# Patient Record
Sex: FEMALE | Race: WHITE | NOT HISPANIC OR LATINO | Employment: PART TIME | URBAN - METROPOLITAN AREA
[De-identification: names, ages, dates, MRNs, and addresses within clinical notes are randomized per-mention and may not be internally consistent; named-entity substitution may affect disease eponyms.]

---

## 2022-01-18 ENCOUNTER — OCCMED (OUTPATIENT)
Dept: URGENT CARE | Facility: CLINIC | Age: 37
End: 2022-01-18

## 2022-01-18 ENCOUNTER — APPOINTMENT (OUTPATIENT)
Dept: LAB | Facility: CLINIC | Age: 37
End: 2022-01-18

## 2022-01-18 DIAGNOSIS — Z02.1 ENCOUNTER FOR PRE-EMPLOYMENT HEALTH SCREENING EXAMINATION: Primary | ICD-10-CM

## 2022-01-18 DIAGNOSIS — Z02.1 ENCOUNTER FOR PRE-EMPLOYMENT HEALTH SCREENING EXAMINATION: ICD-10-CM

## 2022-01-18 LAB — RUBV IGG SERPL IA-ACNC: 55.6 IU/ML

## 2022-01-18 PROCEDURE — 86735 MUMPS ANTIBODY: CPT

## 2022-01-18 PROCEDURE — 86762 RUBELLA ANTIBODY: CPT

## 2022-01-18 PROCEDURE — 86787 VARICELLA-ZOSTER ANTIBODY: CPT

## 2022-01-18 PROCEDURE — 86480 TB TEST CELL IMMUN MEASURE: CPT

## 2022-01-18 PROCEDURE — 86765 RUBEOLA ANTIBODY: CPT

## 2022-01-18 PROCEDURE — 36415 COLL VENOUS BLD VENIPUNCTURE: CPT

## 2022-01-20 LAB
GAMMA INTERFERON BACKGROUND BLD IA-ACNC: 0.02 IU/ML
M TB IFN-G BLD-IMP: NEGATIVE
M TB IFN-G CD4+ BCKGRND COR BLD-ACNC: 0 IU/ML
M TB IFN-G CD4+ BCKGRND COR BLD-ACNC: 0 IU/ML
MEV IGG SER QL: NORMAL
MITOGEN IGNF BCKGRD COR BLD-ACNC: 8.93 IU/ML
MUV IGG SER QL: NORMAL
VZV IGG SER IA-ACNC: NORMAL

## 2022-06-15 ENCOUNTER — OFFICE VISIT (OUTPATIENT)
Dept: OBGYN CLINIC | Facility: CLINIC | Age: 37
End: 2022-06-15
Payer: COMMERCIAL

## 2022-06-15 VITALS — WEIGHT: 225.2 LBS

## 2022-06-15 DIAGNOSIS — Z01.419 ENCOUNTER FOR WELL WOMAN EXAM WITH ROUTINE GYNECOLOGICAL EXAM: Primary | ICD-10-CM

## 2022-06-15 DIAGNOSIS — Z30.430 ENCOUNTER FOR IUD INSERTION: ICD-10-CM

## 2022-06-15 DIAGNOSIS — Z01.419 ENCOUNTER FOR ROUTINE GYNECOLOGICAL EXAMINATION WITH PAPANICOLAOU SMEAR OF CERVIX: ICD-10-CM

## 2022-06-15 PROCEDURE — S0610 ANNUAL GYNECOLOGICAL EXAMINA: HCPCS | Performed by: OBSTETRICS & GYNECOLOGY

## 2022-06-15 PROCEDURE — G0145 SCR C/V CYTO,THINLAYER,RESCR: HCPCS | Performed by: OBSTETRICS & GYNECOLOGY

## 2022-06-15 PROCEDURE — G0476 HPV COMBO ASSAY CA SCREEN: HCPCS | Performed by: OBSTETRICS & GYNECOLOGY

## 2022-06-15 PROCEDURE — 58300 INSERT INTRAUTERINE DEVICE: CPT | Performed by: OBSTETRICS & GYNECOLOGY

## 2022-06-15 NOTE — PROGRESS NOTES
[facility] [address], [address], [place], [place]    ASSESSMENT/PLAN: Alejandro Francis is a 30 y o  [id number] who presents for annual gynecologic exam     Encounter for routine gynecologic examination  - Routine well woman exam completed today  - Cervical Cancer Screening: Current ASCCP Guidelines reviewed  Last Pap: 06/15/2022  Next Pap Due: today  - Contraceptive counseling discussed  Current contraception: Micronor -> Mirena inserted today     Additional problems addressed during this visit:  1  Encounter for well woman exam with routine gynecological exam    2  Encounter for routine gynecological examination with Papanicolaou smear of cervix  -     Liquid-based pap, screening    3  Encounter for IUD insertion  -     levonorgestrel (MIRENA) IUD 20 mcg/day  -     Iud insertions      CC:  Annual Gynecologic Examination    HPI: Alejandro Francis is a 30 y o  [id number] who presents for annual gynecologic examination  HPI  Patient presents for routine annual, still breastfeeding her 9 month old, recently had first cycle since delivery  Interested in IUD for [facility]  counseled and inserted today  Does report a history of postpartum anxiety, recently weaned off Zoloft due to weight gain and decreased libido  Was on Wellbutrin in the past, will consider restarting    The following portions of the patient's history were reviewed and updated as appropriate: allergies, current medications, past family history, past medical history, obstetric history, gynecologic history, past social history, past surgical history and problem list     Review of Systems   Constitutional: Negative  HENT: Negative  Eyes: Negative  Respiratory: Negative  Cardiovascular: Negative  Gastrointestinal: Negative  Genitourinary: Negative  Musculoskeletal: Negative  All other systems reviewed and are negative  Objective:   Wt 102 kg (225 lb 3 2 oz)   LMP 06/10/2022 Comment: 6/10/-6/13 Breastfeeding Yes    Physical Exam  Vitals reviewed  Constitutional:       General: She is not in acute distress  Appearance: She is well-developed  HENT:      Head: Normocephalic and atraumatic  Nose: Nose normal    Cardiovascular:      Rate and Rhythm: Normal rate  Pulmonary:      Effort: Pulmonary effort is normal  No respiratory distress  Chest:   Breasts: Breasts are symmetrical       Right: Normal  No mass, nipple discharge, skin change, tenderness, axillary adenopathy or supraclavicular adenopathy  Left: Normal  No mass, nipple discharge, skin change, tenderness, axillary adenopathy or supraclavicular adenopathy  Abdominal:      General: There is no distension  Palpations: Abdomen is soft  There is no mass  Tenderness: There is no abdominal tenderness  There is no guarding or rebound  Genitourinary:     General: Normal vulva  Exam position: Lithotomy position  Labia:         Right: No lesion  Left: No lesion  Urethra: No prolapse (urethral meatus normal)  Vagina: Normal  No vaginal discharge, erythema or bleeding  Cervix: Normal       Uterus: Normal        Adnexa: Right adnexa normal and left adnexa normal    Musculoskeletal:         General: Normal range of motion  Cervical back: Normal range of motion  Lymphadenopathy:      Upper Body:      Right upper body: No supraclavicular, axillary or pectoral adenopathy  Left upper body: No supraclavicular, axillary or pectoral adenopathy  Lower Body: No right inguinal adenopathy  No left inguinal adenopathy  Skin:     General: Skin is warm and dry  Neurological:      Mental Status: She is alert and oriented to person, place, and time  Psychiatric:         Behavior: Behavior normal          Thought Content:  Thought content normal          Judgment: Judgment normal

## 2022-06-15 NOTE — PROGRESS NOTES
Iud insertions    Date/Time: 6/15/2022 3:30 PM  Performed by: Laura Delacruz MD  Authorized by: Laura Delacruz MD   Universal Protocol:  Consent: Verbal consent obtained  Risks and benefits: risks, benefits and alternatives were discussed  Consent given by: patient  Time out: Immediately prior to procedure a "time out" was called to verify the correct patient, procedure, equipment, support staff and site/side marked as required    Patient understanding: patient states understanding of the procedure being performed  Patient consent: the patient's understanding of the procedure matches consent given  Procedure consent: procedure consent matches procedure scheduled  Patient identity confirmed: verbally with patient        Procedure:     Pelvic exam performed: yes      Cervix cleaned and prepped: yes      Speculum placed in vagina: yes      Tenaculum applied to cervix: yes      Uterus sounded: yes      Uterus sound depth (cm):  9    IUD inserted with no complications: yes      IUD type:  Mirena    Strings trimmed: yes

## 2022-06-16 LAB
HPV HR 12 DNA CVX QL NAA+PROBE: NEGATIVE
HPV16 DNA CVX QL NAA+PROBE: NEGATIVE
HPV18 DNA CVX QL NAA+PROBE: NEGATIVE

## 2022-06-22 LAB
LAB AP GYN PRIMARY INTERPRETATION: NORMAL
LAB AP LMP: NORMAL
Lab: NORMAL

## 2022-07-08 ENCOUNTER — OFFICE VISIT (OUTPATIENT)
Dept: URGENT CARE | Facility: MEDICAL CENTER | Age: 37
End: 2022-07-08
Payer: COMMERCIAL

## 2022-07-08 VITALS
SYSTOLIC BLOOD PRESSURE: 112 MMHG | OXYGEN SATURATION: 97 % | DIASTOLIC BLOOD PRESSURE: 74 MMHG | RESPIRATION RATE: 18 BRPM | TEMPERATURE: 101.4 F | HEART RATE: 140 BPM

## 2022-07-08 DIAGNOSIS — K52.9 GASTROENTERITIS, ACUTE: Primary | ICD-10-CM

## 2022-07-08 LAB
SARS-COV-2 AG UPPER RESP QL IA: NEGATIVE
VALID CONTROL: NORMAL

## 2022-07-08 PROCEDURE — 87811 SARS-COV-2 COVID19 W/OPTIC: CPT

## 2022-07-08 PROCEDURE — 99213 OFFICE O/P EST LOW 20 MIN: CPT

## 2022-07-08 NOTE — PATIENT INSTRUCTIONS
Ibuprofen and/or Tylenol as needed for discomfort and/or fever  Follow the brat diet  Rest and stay hydrated  Follow-up with your primary care provider  Go to ED for worsening symptoms

## 2022-07-08 NOTE — PROGRESS NOTES
[facility]        NAME: Travis Guerrero is a 30 y o  female  : 1985    MRN: [id number]  DATE: 2022  TIME: 6:32 PM    Assessment and Plan   Gastroenteritis, acute [K52 9]  1  Gastroenteritis, acute  Poct Covid 19 Rapid Antigen Test         Patient Instructions     Ibuprofen and/or Tylenol as needed for discomfort and/or fever  Follow the brat diet  Rest and stay hydrated  Follow-up with your primary care provider  Go to ED for worsening symptoms  Chief Complaint     Chief Complaint   Patient presents with    Diarrhea     Patient c/o diarrhea, body aches, and chills that started this morning  Patient tested negatives on a at home Covid test today  History of Present Illness       Patient complaining of abdominal cramping, diarrhea, body aches, fatigue, nausea, and fever that started yesterday  She reports taking a rapid COVID test at home this morning with the the results  She denies trying alleviating factors  Patient denies significant PMH  Review of Systems   Review of Systems   Constitutional: Positive for fatigue and fever  HENT: Negative for congestion, sinus pressure and sore throat  Respiratory: Negative for cough and shortness of breath  Cardiovascular: Negative for chest pain  Gastrointestinal: Positive for abdominal pain, diarrhea and nausea  Negative for abdominal distention, blood in stool and vomiting  Genitourinary: Negative for dysuria  All other systems reviewed and are negative          Current Medications       Current Outpatient Medications:     Prenatal Vit-DSS-Fe Cbn-FA (PRENATAL AD PO), Take by mouth, Disp: , Rfl:     Current Allergies     Allergies as of 2022 - Reviewed 2022   Allergen Reaction Noted    Sulfa antibiotics Other (See Comments) 06/15/2022            The following portions of the patient's history were reviewed and updated as appropriate: allergies, current medications, past family history, past medical history, past social history, past surgical history and problem list      History reviewed  No pertinent past medical history  Past Surgical History:   Procedure Laterality Date     SECTION, LOW TRANSVERSE         History reviewed  No pertinent family history  Medications have been verified  Objective   /74   Pulse (!) 140   Temp (!) 101 4 °F (38 6 °C)   Resp 18   LMP 06/10/2022 Comment: 6/10/-  SpO2 97%        Physical Exam     Physical Exam  Vitals and nursing note reviewed  Constitutional:       General: She is not in acute distress  Appearance: Normal appearance  She is not toxic-appearing  HENT:      Head: Normocephalic and atraumatic  Right Ear: Tympanic membrane, ear canal and external ear normal       Left Ear: Tympanic membrane, ear canal and external ear normal       Nose: Nose normal  No congestion or rhinorrhea  Mouth/Throat:      Mouth: Mucous membranes are moist       Pharynx: Oropharynx is clear  No oropharyngeal exudate or posterior oropharyngeal erythema  Eyes:      General: No scleral icterus  Right eye: No discharge  Left eye: No discharge  Conjunctiva/sclera: Conjunctivae normal    Cardiovascular:      Rate and Rhythm: Normal rate and regular rhythm  Heart sounds: Normal heart sounds  Pulmonary:      Effort: Pulmonary effort is normal       Breath sounds: Normal breath sounds  Abdominal:      General: Bowel sounds are normal       Palpations: Abdomen is soft  Tenderness: There is no abdominal tenderness  There is no guarding or rebound  Musculoskeletal:         General: Normal range of motion  Cervical back: Normal range of motion  Lymphadenopathy:      Cervical: No cervical adenopathy  Skin:     General: Skin is warm and dry  Neurological:      General: No focal deficit present  Mental Status: She is alert and oriented to person, place, and time     Psychiatric:         Mood and Affect: Mood normal          Behavior: Behavior normal

## 2022-07-11 ENCOUNTER — TELEPHONE (OUTPATIENT)
Dept: OBGYN CLINIC | Facility: MEDICAL CENTER | Age: 37
End: 2022-07-11

## 2022-07-11 DIAGNOSIS — N61.0 MASTITIS: Primary | ICD-10-CM

## 2022-07-11 NOTE — TELEPHONE ENCOUNTER
LMOM for patient to call office to schedule a office visit regarding possible mastitis  Pt offered visits today and tomorrow   Please schedule once call returned

## 2022-07-18 ENCOUNTER — TELEPHONE (OUTPATIENT)
Dept: FAMILY MEDICINE CLINIC | Facility: CLINIC | Age: 37
End: 2022-07-18

## 2022-08-02 ENCOUNTER — TELEPHONE (OUTPATIENT)
Dept: OBGYN CLINIC | Facility: MEDICAL CENTER | Age: 37
End: 2022-08-02

## 2022-09-06 ENCOUNTER — OFFICE VISIT (OUTPATIENT)
Dept: OBGYN CLINIC | Facility: CLINIC | Age: 37
End: 2022-09-06
Payer: COMMERCIAL

## 2022-09-06 VITALS
DIASTOLIC BLOOD PRESSURE: 64 MMHG | BODY MASS INDEX: 40.75 KG/M2 | WEIGHT: 230 LBS | SYSTOLIC BLOOD PRESSURE: 114 MMHG | HEIGHT: 63 IN

## 2022-09-06 DIAGNOSIS — Z30.431 IUD CHECK UP: Primary | ICD-10-CM

## 2022-09-06 PROBLEM — Z97.5 IUD (INTRAUTERINE DEVICE) IN PLACE: Status: ACTIVE | Noted: 2022-09-06

## 2022-09-06 PROCEDURE — 99213 OFFICE O/P EST LOW 20 MIN: CPT | Performed by: OBSTETRICS & GYNECOLOGY

## 2022-09-06 NOTE — PROGRESS NOTES
Assessment/Plan    Diagnoses and all orders for this visit:    IUD check up    Other orders  -     Levonorgestrel (MIRENA) 20 MCG/DAY IUD; 1 each by Intrauterine route once         Ward Harvey is an 30 y o  woman who presents for IUD string check  Patient has the following IUD: Mirena  She reports no complaints  Pt does not report bleeding issues  Pt does not report pain issues  Patient Active Problem List   Diagnosis    IUD (intrauterine device) in place       No past medical history on file  Current Outpatient Medications:     Levonorgestrel (MIRENA) 20 MCG/DAY IUD, 1 each by Intrauterine route once , Disp: , Rfl:     Prenatal Vit-DSS-Fe Cbn-FA (PRENATAL AD PO), Take by mouth, Disp: , Rfl:     Allergies   Allergen Reactions    Sulfa Antibiotics Other (See Comments)     Mouth ulcers   Stomach ulcers         Review of Systems  Constitutional :no fever, feels well, no tiredness, no recent weight gain or loss  ENT: no ear ache, no loss of hearing, no nosebleeds or nasal discharge, no sore throat or hoarseness  Cardiovascular: no complaints of slow or fast heart beat, no chest pain, no palpitations, no leg claudication or lower extremity edema  Respiratory: no complaints of shortness of shortness of breath, no BELTRAN  Breasts:no complaints of breast pain, breast lump, or nipple discharge  Gastrointestinal: no complaints of abdominal pain, constipation, nausea, vomiting, or diarrhea or bloody stools  Genitourinary : no complaints of dysuria, incontinence, pelvic pain, no dysmenorrhea, vaginal discharge or abnormal vaginal bleeding and as noted in HPI    Integumentary: no complaints of skin rash or lesion, itching or dry skin  Neurological: no complaints of headache, no confusion, no numbness or tingling, no dizziness or fainting    Objective    /64   Ht 5' 3" (1 6 m)   Wt 104 kg (230 lb)   LMP 07/13/2022 (Approximate)   BMI 40 74 kg/m²     General: alert and oriented, in no acute distress   Psychiatric orientation to person, place and time: normal   Mood and affect: normal   Vulva: normal   Vagina: normal mucosa   Cervix: IUD strings x 2

## 2022-09-09 ENCOUNTER — APPOINTMENT (OUTPATIENT)
Dept: LAB | Facility: CLINIC | Age: 37
End: 2022-09-09

## 2022-09-09 DIAGNOSIS — Z00.8 HEALTH EXAMINATION IN POPULATION SURVEY: ICD-10-CM

## 2022-09-09 LAB
CHOLEST SERPL-MCNC: 245 MG/DL
EST. AVERAGE GLUCOSE BLD GHB EST-MCNC: 108 MG/DL
HBA1C MFR BLD: 5.4 %
HDLC SERPL-MCNC: 35 MG/DL
LDLC SERPL CALC-MCNC: 169 MG/DL (ref 0–100)
NONHDLC SERPL-MCNC: 210 MG/DL
TRIGL SERPL-MCNC: 207 MG/DL

## 2022-09-09 PROCEDURE — 83036 HEMOGLOBIN GLYCOSYLATED A1C: CPT

## 2022-09-09 PROCEDURE — 36415 COLL VENOUS BLD VENIPUNCTURE: CPT

## 2022-09-09 PROCEDURE — 80061 LIPID PANEL: CPT

## 2022-10-18 ENCOUNTER — OFFICE VISIT (OUTPATIENT)
Dept: FAMILY MEDICINE CLINIC | Facility: CLINIC | Age: 37
End: 2022-10-18
Payer: COMMERCIAL

## 2022-10-18 VITALS
DIASTOLIC BLOOD PRESSURE: 80 MMHG | HEART RATE: 89 BPM | WEIGHT: 228 LBS | RESPIRATION RATE: 16 BRPM | HEIGHT: 63 IN | TEMPERATURE: 97.7 F | BODY MASS INDEX: 40.4 KG/M2 | SYSTOLIC BLOOD PRESSURE: 116 MMHG | OXYGEN SATURATION: 98 %

## 2022-10-18 DIAGNOSIS — Z11.4 SCREENING FOR HIV (HUMAN IMMUNODEFICIENCY VIRUS): ICD-10-CM

## 2022-10-18 DIAGNOSIS — Z23 NEED FOR INFLUENZA VACCINATION: ICD-10-CM

## 2022-10-18 DIAGNOSIS — E78.5 HYPERLIPIDEMIA, UNSPECIFIED HYPERLIPIDEMIA TYPE: ICD-10-CM

## 2022-10-18 DIAGNOSIS — Z13.29 SCREENING FOR THYROID DISORDER: ICD-10-CM

## 2022-10-18 DIAGNOSIS — Z00.00 ANNUAL PHYSICAL EXAM: Primary | ICD-10-CM

## 2022-10-18 DIAGNOSIS — E56.9 VITAMIN DEFICIENCY: ICD-10-CM

## 2022-10-18 DIAGNOSIS — Z11.59 NEED FOR HEPATITIS C SCREENING TEST: ICD-10-CM

## 2022-10-18 DIAGNOSIS — Z13.89 ENCOUNTER FOR SURVEILLANCE OF ABNORMAL NEVI: ICD-10-CM

## 2022-10-18 DIAGNOSIS — F41.9 ANXIETY: ICD-10-CM

## 2022-10-18 DIAGNOSIS — Z13.0 SCREENING, ANEMIA, DEFICIENCY, IRON: ICD-10-CM

## 2022-10-18 DIAGNOSIS — Z76.89 ENCOUNTER TO ESTABLISH CARE: ICD-10-CM

## 2022-10-18 DIAGNOSIS — R53.83 OTHER FATIGUE: ICD-10-CM

## 2022-10-18 DIAGNOSIS — Z13.1 SCREENING FOR DIABETES MELLITUS: ICD-10-CM

## 2022-10-18 PROCEDURE — 90471 IMMUNIZATION ADMIN: CPT | Performed by: NURSE PRACTITIONER

## 2022-10-18 PROCEDURE — 99385 PREV VISIT NEW AGE 18-39: CPT | Performed by: NURSE PRACTITIONER

## 2022-10-18 PROCEDURE — 90686 IIV4 VACC NO PRSV 0.5 ML IM: CPT | Performed by: NURSE PRACTITIONER

## 2022-10-18 NOTE — PROGRESS NOTES
ADULT ANNUAL [address] [place] [address] [facility]    NAME: Promise White  AGE: 30 y o  SEX: female  : 1985     DATE: 10/18/2022  Comprehensive physical exam  PMH and chronic conditions reviewed  Medications reconciled  Preventative care and recommended screenings as below  Continue annual physicals  Follow-up sooner as needed   Assessment and Plan:     Problem List Items Addressed This Visit        Musculoskeletal and Integument    Encounter for surveillance of abnormal nevi     2 nevi removed by Derm in [address] [place] [address]  Left lateral abdominal nevi appears to be returning  Uncertain of biopsy results of prior shaves  Will refer to Derm today for evaluation and likely biopsy,  and to establish for routine skin checks  Relevant Orders    Ambulatory Referral to Dermatology       Other    Hyperlipidemia     History of  Pt states elevated as teen/young adult  Lipids for Caring Starts with You in September: 245///169  Referral to weight management placed  Recheck lipids 6 months  May benefit from low dose statin therapy  Will discuss after repeat labs completed  Relevant Orders    Ambulatory Referral to Weight Management    Other fatigue     Notes for the last several months  Discussed likely multi factorial: recent move to this area, 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), 3 children (ages 11,3, 16 month), allergies  Will get labs today as below  Assess thyroid iron and vitamins  Will discuss possible treatment options once resulted  Relevant Orders    Vitamin B12    Vitamin D 25 hydroxy    Comprehensive metabolic panel    CBC and differential    Iron Panel (Includes Ferritin, Iron Sat%, Iron, and TIBC)    TSH, 3rd generation with Free T4 reflex    BMI 40 0-44 9, adult (HCC)     Motivated for lifestyle change - looking to get healthier   Referral to establish with weight management Relevant Orders    Ambulatory Referral to Weight Management    Anxiety     Long time history of  Has been on medications in the past  Most recent Sertraline post partum  Noted increased weight gain  Did successfully wean a few months ago  No depression, but does note periods of persistent anxiety  Options reviewed  Will avoid SSRI  She reports anxiety fairly well managed on Wellbutrin in college  Will consider restarting  Currently lactating  Plans to wean baby off in the next weeks/months  Will consider starting Wellbutrin - 200SR daily,  once wean completed  Other Visit Diagnoses     Annual physical exam    -  Primary    Vitamin deficiency        Relevant Orders    Vitamin B12    Vitamin D 25 hydroxy    Screening for thyroid disorder        Relevant Orders    TSH, 3rd generation with Free T4 reflex    Screening, anemia, deficiency, iron        Relevant Orders    CBC and differential    Iron Panel (Includes Ferritin, Iron Sat%, Iron, and TIBC)    Screening for diabetes mellitus        Relevant Orders    Comprehensive metabolic panel    Screening for HIV (human immunodeficiency virus)        Relevant Orders    HIV 1/2 Antigen/Antibody (4th Generation) w Reflex [facility]    Need for hepatitis C screening test        Relevant Orders    Hepatitis C antibody    Encounter to establish care        Establishing primary in our office  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)    Need for influenza vaccination        Relevant Orders    influenza vaccine, quadrivalent, 0 5 mL, preservative-free, for adult and pediatric patients 6 mos+ (AFLURIA, FLUARIX, FLULAVAL, FLUZONE) (Completed)          Immunizations and preventive care screenings were discussed with patient today  Appropriate education was printed on patient's after visit summary      Counseling:  Alcohol/drug use: discussed moderation in alcohol intake, the recommendations for healthy alcohol use, and avoidance of illicit drug use   Dental Health: discussed importance of regular tooth brushing, flossing, and dental visits  Injury prevention: discussed safety/seat belts, safety helmets, smoke detectors, carbon dioxide detectors, and smoking near bedding or upholstery  Sexual health: discussed sexually transmitted diseases, partner selection, use of condoms, avoidance of unintended pregnancy, and contraceptive alternatives  · Exercise: the importance of regular exercise/physical activity was discussed  Recommend exercise 3-5 times per week for at least 30 minutes  BMI Counseling: Body mass index is 40 39 kg/m²  The BMI is above normal  Nutrition recommendations include decreasing portion sizes, consuming healthier snacks, moderation in carbohydrate intake, increasing intake of lean protein and reducing intake of saturated and trans fat  Exercise recommendations include moderate physical activity 150 minutes/week  Patient referred to weight management  Rationale for BMI follow-up plan is due to patient being overweight or obese  Healthy lifestyle counseling     Depression Screening and Follow-up Plan: Patient was screened for depression during today's encounter  They screened negative with a PHQ-2 score of 0  Return in about 1 year (around 10/18/2023) for Annual physical      Chief Complaint:     Chief Complaint   Patient presents with   • Establish Care      History of Present Illness:     Adult Annual Physical   Patient here for a comprehensive physical exam  The patient reports no problems  Diet and Physical Activity  · Diet/Nutrition: well balanced diet and working to improve diet  · Exercise: no formal exercise  Depression Screening  PHQ-2/9 Depression Screening    Little interest or pleasure in doing things: 0 - not at all  Feeling down, depressed, or hopeless: 0 - not at all  PHQ-2 Score: 0  PHQ-2 Interpretation: Negative depression screen       General Health  · Sleep: sleeps well     · Hearing: normal - bilateral   · Vision: no vision problems, most recent eye exam >1 year ago, previous LASIK surgery and lasik at age 24  Snellen: 20/20 OU today  · Dental: regular dental visits  /GYN Health  · Last menstrual period: IUD  · Contraceptive method: IUD placement  · History of STDs?: no   · Monogamous relationship  · Women's health up to date  · PAP/HPV (-) 2022  · Encourage self breast exams  · Immunizations up to date: Covid ; Tdap 2021; Influenza today  · Vitamin supplementation - will discuss further once labs completed     Review of Systems:     Review of Systems   Constitutional: Positive for fatigue  Negative for activity change, appetite change and unexpected weight change  HENT: Negative  Eyes: Negative  Respiratory: Negative  Cardiovascular: Negative  Gastrointestinal: Negative  Genitourinary: Negative  Musculoskeletal: Negative  Skin: Negative  Neurological: Negative  Psychiatric/Behavioral: Negative  Past Medical History:     History reviewed  No pertinent past medical history  Past Surgical History:     Past Surgical History:   Procedure Laterality Date   •  SECTION, LOW TRANSVERSE        Social History:     Social History     Socioeconomic History   • Marital status: /Civil Union     Spouse name: None   • Number of children: None   • Years of education: None   • Highest education level: None   Occupational History   • None   Tobacco Use   • Smoking status: Never Smoker   • Smokeless tobacco: Never Used   Vaping Use   • Vaping Use: Never used   Substance and Sexual Activity   • Alcohol use: Never   • Drug use: Never   • Sexual activity: Yes     Partners: Male     Birth control/protection: I U D     Other Topics Concern   • None   Social History Narrative   • None     Social Determinants of Health     Financial Resource Strain: Not on file   Food Insecurity: Not on file   Transportation Needs: Not on file   Physical Activity: Not on file Stress: Not on file   Social Connections: Not on file   Intimate Partner Violence: Not on file   Housing Stability: Not on file      Family History:     History reviewed  No pertinent family history  Current Medications:     Current Outpatient Medications   Medication Sig Dispense Refill   • Levonorgestrel (MIRENA) 20 MCG/DAY IUD 1 each by Intrauterine route once      • Prenatal Vit-DSS-Fe Cbn-FA (PRENATAL AD PO) Take by mouth       No current facility-administered medications for this visit  Allergies: Allergies   Allergen Reactions   • Sulfa Antibiotics Other (See Comments)     Mouth ulcers   Stomach ulcers        Physical Exam:     /80 (BP Location: Right arm, Patient Position: Sitting, Cuff Size: Large)   Pulse 89   Temp 97 7 °F (36 5 °C) (Temporal)   Resp 16   Ht 5' 3" (1 6 m)   Wt 103 kg (228 lb)   SpO2 98%   BMI 40 39 kg/m²     Physical Exam  Vitals and nursing note reviewed  Constitutional:       General: She is not in acute distress  Appearance: Normal appearance  She is well-developed and well-groomed  She is not ill-appearing  HENT:      Head: Normocephalic  Right Ear: Tympanic membrane, ear canal and external ear normal       Left Ear: Tympanic membrane, ear canal and external ear normal       Nose: Nose normal       Mouth/Throat:      Mouth: Mucous membranes are moist       Pharynx: Oropharynx is clear  Eyes:      Conjunctiva/sclera: Conjunctivae normal       Pupils: Pupils are equal, round, and reactive to light  Neck:      Thyroid: No thyromegaly  Vascular: No carotid bruit  Cardiovascular:      Rate and Rhythm: Normal rate and regular rhythm  Pulses:           Posterior tibial pulses are 2+ on the right side and 2+ on the left side  Heart sounds: Normal heart sounds  Pulmonary:      Effort: Pulmonary effort is normal  No respiratory distress  Breath sounds: Normal breath sounds and air entry     Abdominal:      General: Bowel sounds are normal       Palpations: Abdomen is soft  Tenderness: There is no abdominal tenderness  Musculoskeletal:      Right lower leg: No edema  Left lower leg: No edema  Lymphadenopathy:      Cervical: No cervical adenopathy  Skin:     General: Skin is warm and dry  Neurological:      General: No focal deficit present  Mental Status: She is alert and oriented to person, place, and time  Psychiatric:         Attention and Perception: Attention normal          Mood and Affect: Mood normal          Behavior: Behavior normal          Thought Content:  Thought content normal          Judgment: Judgment normal           INA Garces   [address] [facility] [address]

## 2022-10-18 NOTE — ASSESSMENT & PLAN NOTE
Long time history of  Has been on medications in the past  Most recent Sertraline post partum  Noted increased weight gain  Did successfully wean a few months ago  No depression, but does note periods of persistent anxiety  Options reviewed  Will avoid SSRI  She reports anxiety fairly well managed on Wellbutrin in college  Will consider restarting  Currently lactating  Plans to wean baby off in the next weeks/months  Will consider starting Wellbutrin - 200SR daily,  once wean completed

## 2022-10-18 NOTE — PATIENT INSTRUCTIONS

## 2022-10-18 NOTE — ASSESSMENT & PLAN NOTE
2 nevi removed by Derm in [place]  Left lateral abdominal nevi appears to be returning  Uncertain of biopsy results of prior shaves  Will refer to Derm today for evaluation and likely biopsy,  and to establish for routine skin checks

## 2022-10-18 NOTE — ASSESSMENT & PLAN NOTE
Motivated for lifestyle change - looking to get healthier   Referral to establish with weight management

## 2022-10-18 NOTE — ASSESSMENT & PLAN NOTE
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

## 2022-10-18 NOTE — ASSESSMENT & PLAN NOTE
History of  Pt states elevated as teen/young adult  Lipids for [facility] Starts with You in September: 245/207/35/169  Referral to weight management placed  Recheck lipids 6 months  May benefit from low dose statin therapy  Will discuss after repeat labs completed

## 2022-10-19 ENCOUNTER — APPOINTMENT (OUTPATIENT)
Dept: LAB | Facility: CLINIC | Age: 37
End: 2022-10-19
Payer: COMMERCIAL

## 2022-10-19 DIAGNOSIS — Z11.59 NEED FOR HEPATITIS C SCREENING TEST: ICD-10-CM

## 2022-10-19 DIAGNOSIS — Z13.1 SCREENING FOR DIABETES MELLITUS: ICD-10-CM

## 2022-10-19 DIAGNOSIS — Z11.4 SCREENING FOR HIV (HUMAN IMMUNODEFICIENCY VIRUS): ICD-10-CM

## 2022-10-19 DIAGNOSIS — E56.9 VITAMIN DEFICIENCY: ICD-10-CM

## 2022-10-19 DIAGNOSIS — R53.83 OTHER FATIGUE: ICD-10-CM

## 2022-10-19 DIAGNOSIS — Z13.0 SCREENING, ANEMIA, DEFICIENCY, IRON: ICD-10-CM

## 2022-10-19 DIAGNOSIS — Z13.29 SCREENING FOR THYROID DISORDER: ICD-10-CM

## 2022-10-19 LAB
25(OH)D3 SERPL-MCNC: 23.8 NG/ML (ref 30–100)
ALBUMIN SERPL BCP-MCNC: 3.8 G/DL (ref 3.5–5)
ALP SERPL-CCNC: 104 U/L (ref 46–116)
ALT SERPL W P-5'-P-CCNC: 37 U/L (ref 12–78)
ANION GAP SERPL CALCULATED.3IONS-SCNC: 4 MMOL/L (ref 4–13)
AST SERPL W P-5'-P-CCNC: 17 U/L (ref 5–45)
BASOPHILS # BLD AUTO: 0.03 THOUSANDS/ÂΜL (ref 0–0.1)
BASOPHILS NFR BLD AUTO: 0 % (ref 0–1)
BILIRUB SERPL-MCNC: 0.29 MG/DL (ref 0.2–1)
BUN SERPL-MCNC: 13 MG/DL (ref 5–25)
CALCIUM SERPL-MCNC: 9.3 MG/DL (ref 8.3–10.1)
CHLORIDE SERPL-SCNC: 108 MMOL/L (ref 96–108)
CO2 SERPL-SCNC: 26 MMOL/L (ref 21–32)
CREAT SERPL-MCNC: 0.79 MG/DL (ref 0.6–1.3)
EOSINOPHIL # BLD AUTO: 0.09 THOUSAND/ÂΜL (ref 0–0.61)
EOSINOPHIL NFR BLD AUTO: 1 % (ref 0–6)
ERYTHROCYTE [DISTWIDTH] IN BLOOD BY AUTOMATED COUNT: 13.7 % (ref 11.6–15.1)
FERRITIN SERPL-MCNC: 58 NG/ML (ref 8–388)
GFR SERPL CREATININE-BSD FRML MDRD: 97 ML/MIN/1.73SQ M
GLUCOSE P FAST SERPL-MCNC: 90 MG/DL (ref 65–99)
HCT VFR BLD AUTO: 42.9 % (ref 34.8–46.1)
HCV AB SER QL: NORMAL
HGB BLD-MCNC: 13.3 G/DL (ref 11.5–15.4)
IMM GRANULOCYTES # BLD AUTO: 0.03 THOUSAND/UL (ref 0–0.2)
IMM GRANULOCYTES NFR BLD AUTO: 0 % (ref 0–2)
IRON SATN MFR SERPL: 20 % (ref 15–50)
IRON SERPL-MCNC: 66 UG/DL (ref 50–170)
LYMPHOCYTES # BLD AUTO: 2.61 THOUSANDS/ÂΜL (ref 0.6–4.47)
LYMPHOCYTES NFR BLD AUTO: 33 % (ref 14–44)
MCH RBC QN AUTO: 25.8 PG (ref 26.8–34.3)
MCHC RBC AUTO-ENTMCNC: 31 G/DL (ref 31.4–37.4)
MCV RBC AUTO: 83 FL (ref 82–98)
MONOCYTES # BLD AUTO: 0.45 THOUSAND/ÂΜL (ref 0.17–1.22)
MONOCYTES NFR BLD AUTO: 6 % (ref 4–12)
NEUTROPHILS # BLD AUTO: 4.63 THOUSANDS/ÂΜL (ref 1.85–7.62)
NEUTS SEG NFR BLD AUTO: 60 % (ref 43–75)
NRBC BLD AUTO-RTO: 0 /100 WBCS
PLATELET # BLD AUTO: 287 THOUSANDS/UL (ref 149–390)
PMV BLD AUTO: 10 FL (ref 8.9–12.7)
POTASSIUM SERPL-SCNC: 4 MMOL/L (ref 3.5–5.3)
PROT SERPL-MCNC: 7.8 G/DL (ref 6.4–8.4)
RBC # BLD AUTO: 5.16 MILLION/UL (ref 3.81–5.12)
SODIUM SERPL-SCNC: 138 MMOL/L (ref 135–147)
TIBC SERPL-MCNC: 324 UG/DL (ref 250–450)
TSH SERPL DL<=0.05 MIU/L-ACNC: 1.82 UIU/ML (ref 0.45–4.5)
VIT B12 SERPL-MCNC: 1035 PG/ML (ref 100–900)
WBC # BLD AUTO: 7.84 THOUSAND/UL (ref 4.31–10.16)

## 2022-10-19 PROCEDURE — 36415 COLL VENOUS BLD VENIPUNCTURE: CPT

## 2022-10-19 PROCEDURE — 83540 ASSAY OF IRON: CPT

## 2022-10-19 PROCEDURE — 85025 COMPLETE CBC W/AUTO DIFF WBC: CPT

## 2022-10-19 PROCEDURE — 82607 VITAMIN B-12: CPT

## 2022-10-19 PROCEDURE — 80053 COMPREHEN METABOLIC PANEL: CPT

## 2022-10-19 PROCEDURE — 87389 HIV-1 AG W/HIV-1&-2 AB AG IA: CPT

## 2022-10-19 PROCEDURE — 82306 VITAMIN D 25 HYDROXY: CPT

## 2022-10-19 PROCEDURE — 84443 ASSAY THYROID STIM HORMONE: CPT

## 2022-10-19 PROCEDURE — 83550 IRON BINDING TEST: CPT

## 2022-10-19 PROCEDURE — 86803 HEPATITIS C AB TEST: CPT

## 2022-10-19 PROCEDURE — 82728 ASSAY OF FERRITIN: CPT

## 2022-10-20 LAB — HIV 1+2 AB+HIV1 P24 AG SERPL QL IA: NORMAL

## 2022-12-05 ENCOUNTER — CONSULT (OUTPATIENT)
Dept: BARIATRICS | Facility: CLINIC | Age: 37
End: 2022-12-05

## 2022-12-05 VITALS
BODY MASS INDEX: 39.88 KG/M2 | HEIGHT: 64 IN | OXYGEN SATURATION: 99 % | HEART RATE: 101 BPM | SYSTOLIC BLOOD PRESSURE: 128 MMHG | WEIGHT: 233.6 LBS | RESPIRATION RATE: 18 BRPM | DIASTOLIC BLOOD PRESSURE: 80 MMHG

## 2022-12-05 DIAGNOSIS — E78.5 HYPERLIPIDEMIA, UNSPECIFIED HYPERLIPIDEMIA TYPE: ICD-10-CM

## 2022-12-05 DIAGNOSIS — E66.9 OBESE: Primary | ICD-10-CM

## 2022-12-05 NOTE — PROGRESS NOTES
-Discussed options of [facility]-Intensive Lifestyle Intervention Program, Very Low Calorie Diet-VLCD and Conservative Program and the role of weight loss medications   -Initial weight loss goal of 5-10% weight loss for improved health  -Screening labs 10/2022 - high cholesterol only   -Patient is interested in pursuing Conservative Program with RD visits   - attributes much of her weight gain to pregnancies and emotional eating and having been on lexapro in the past  Also recently moved to [place] from [address] [place] [address] this past year and has been more stressed   - of note, patient is currently still breastfeeding her son  We dicussed that we do not recommend active weight loss as she continues to breastfeed however can recommend healthy diet and eating through RD visits  Once she is done breastfeeding we can focus on more active weight loss       Assessment/Plan:    Follow up in approximately 2 months  Goals:  Food log (ie ) www myfitnesspal com,sparkpeople  com,loseit com,calorieking  com,etc  baritastic  No sugary beverages  At least 64oz of water daily  Increase physical activity by 10 minutes daily  Gradually increase physical activity to a goal of 5 days per week for 30 minutes of MODERATE intensity PLUS 2 days per week of FULL BODY resistance training  5-10 servings of fruits and vegetables per day and 25-35 grams of dietary fiber per day, gradually increasing  Follow up with dietician for menu planning     Diagnoses and all orders for this visit:    Obese  -     Ambulatory Referral to Weight Management    Hyperlipidemia, unspecified hyperlipidemia type  -     Ambulatory Referral to Weight Management        Subjective:   Chief Complaint   Patient presents with   • Consult     MWM waist 43 5, no goal just health, s/b 2-8 neg      Patient ID: Demetrio Oscar  is a 30 y o  female with excess weight/obesity here to pursue weight management  History reviewed  No pertinent past medical history    HPI:  Obesity/Excess Weight:  Severity: class III   Onset:  years    Modifiers: Diet and Exercise, weight watchers   Contributing factors: Stress/Emotional Eating and Pregnancy  Colonoscopy-Not applicable    Goals: just healthy   STOPBAN/8    The following portions of the patient's history were reviewed and updated as appropriate: allergies, current medications, past family history, past medical history, past social history, past surgical history and problem list     Review of Systems   Constitutional: Negative  Respiratory: Negative  Cardiovascular: Negative  Gastrointestinal: Negative  Neurological: Negative  Psychiatric/Behavioral: Negative  Objective:    /80   Pulse 101   Resp 18   Ht 5' 3 5" (1 613 m)   Wt 106 kg (233 lb 9 6 oz)   SpO2 99%   BMI 40 73 kg/m²     Physical Exam  Vitals and nursing note reviewed  Constitutional:       Appearance: Normal appearance  She is obese  HENT:      Head: Normocephalic and atraumatic  Eyes:      Extraocular Movements: Extraocular movements intact  Pupils: Pupils are equal, round, and reactive to light  Cardiovascular:      Rate and Rhythm: Normal rate  Pulmonary:      Effort: Pulmonary effort is normal    Musculoskeletal:         General: Normal range of motion  Cervical back: Normal range of motion  Skin:     General: Skin is warm and dry  Neurological:      General: No focal deficit present  Mental Status: She is alert and oriented to person, place, and time     Psychiatric:         Mood and Affect: Mood normal

## 2022-12-05 NOTE — PATIENT INSTRUCTIONS
Follow up in approximately 2 months  Goals:  Food log (ie ) www myfitnesspal com,sparkpeople  com,loseit com,calorieking  com,etc  baritastic  No sugary beverages  At least 64oz of water daily  Increase physical activity by 10 minutes daily   Gradually increase physical activity to a goal of 5 days per week for 30 minutes of MODERATE intensity PLUS 2 days per week of FULL BODY resistance training  5-10 servings of fruits and vegetables per day and 25-35 grams of dietary fiber per day, gradually increasing  Follow up with dietician for menu planning

## 2022-12-29 ENCOUNTER — TELEPHONE (OUTPATIENT)
Dept: BARIATRICS | Facility: CLINIC | Age: 37
End: 2022-12-29

## 2023-07-26 ENCOUNTER — OFFICE VISIT (OUTPATIENT)
Dept: FAMILY MEDICINE CLINIC | Facility: CLINIC | Age: 38
End: 2023-07-26
Payer: COMMERCIAL

## 2023-07-26 VITALS
RESPIRATION RATE: 20 BRPM | HEART RATE: 93 BPM | BODY MASS INDEX: 41.99 KG/M2 | OXYGEN SATURATION: 98 % | TEMPERATURE: 98.1 F | HEIGHT: 63 IN | DIASTOLIC BLOOD PRESSURE: 78 MMHG | SYSTOLIC BLOOD PRESSURE: 116 MMHG | WEIGHT: 237 LBS

## 2023-07-26 DIAGNOSIS — Z13.0 SCREENING, ANEMIA, DEFICIENCY, IRON: ICD-10-CM

## 2023-07-26 DIAGNOSIS — M25.50 ARTHRALGIA, UNSPECIFIED JOINT: Primary | ICD-10-CM

## 2023-07-26 DIAGNOSIS — R74.8 INCREASED VITAMIN B12 LEVEL: ICD-10-CM

## 2023-07-26 DIAGNOSIS — Z13.1 SCREENING FOR DIABETES MELLITUS: ICD-10-CM

## 2023-07-26 DIAGNOSIS — E78.5 HYPERLIPIDEMIA, UNSPECIFIED HYPERLIPIDEMIA TYPE: ICD-10-CM

## 2023-07-26 DIAGNOSIS — R47.89 WORD FINDING DIFFICULTY: ICD-10-CM

## 2023-07-26 DIAGNOSIS — E55.9 VITAMIN D DEFICIENCY: ICD-10-CM

## 2023-07-26 DIAGNOSIS — R53.83 OTHER FATIGUE: ICD-10-CM

## 2023-07-26 DIAGNOSIS — Z13.29 SCREENING FOR THYROID DISORDER: ICD-10-CM

## 2023-07-26 DIAGNOSIS — R61 EXCESSIVE SWEATING: ICD-10-CM

## 2023-07-26 DIAGNOSIS — E04.9 ENLARGED THYROID: ICD-10-CM

## 2023-07-26 PROCEDURE — 99214 OFFICE O/P EST MOD 30 MIN: CPT | Performed by: NURSE PRACTITIONER

## 2023-07-26 NOTE — PROGRESS NOTES
[facility]    ASSESSMENT AND PLAN     1. Arthralgia, unspecified joint  Assessment & Plan:  Multiple symptoms discussed at length today. Note work up 9/2022 for excessive fatigue. Will recheck vitamins and thyroid today. Thyroid appears possible mildly enlarged today - will check US as well. Will r/o auto immune: check TONG, CRP, RF. Referral to Endo for consult and possible further work up for endocrine etiology. Advised to f/u with Gyn - IUD check  Discussed anxiety as possible component: may consider SNRI in future (once completed breast feeding) if work up (-). She has tried and failed with few SSRI: Lexapro, Sertraline, Celexa. Has not followed with Weight management yet - waiting to completely wean son from breast feeding - plan a t age 5 next month. F/U here in 2 weeks - review labs and imaging - annual physical at that time. F/U sooner with further problems/concerns. Pt agreeable with this plan    Orders:  -     TONG Screen w/ Reflex to Titer/Pattern; Future  -     C-reactive protein; Future  -     RF Screen w/ Reflex to Titer; Future  -     Ambulatory Referral to Endocrinology; Future    2. Excessive sweating  -     TONG Screen w/ Reflex to Titer/Pattern; Future  -     C-reactive protein; Future  -     Ambulatory Referral to Endocrinology; Future    3. Word finding difficulty  -     TONG Screen w/ Reflex to Titer/Pattern; Future  -     C-reactive protein; Future    4. Enlarged thyroid  -     US thyroid; Future; Expected date: 07/26/2023    5. Vitamin D deficiency  -     Vitamin D 25 hydroxy; Future    6. Other fatigue  -     TSH, 3rd generation with Free T4 reflex; Future  -     TONG Screen w/ Reflex to Titer/Pattern; Future  -     C-reactive protein; Future  -     RF Screen w/ Reflex to Titer; Future  -     Ambulatory Referral to Endocrinology; Future    7. Increased vitamin B12 level  -     Vitamin B12; Future    8.  Hyperlipidemia, unspecified hyperlipidemia type  -     Lipid panel; Future    9. Screening for thyroid disorder  -     TSH, 3rd generation with Free T4 reflex; Future    10. Screening, anemia, deficiency, iron  -     CBC and differential; Future    11. Screening for diabetes mellitus  -     Comprehensive metabolic panel; Future  -     HEMOGLOBIN A1C W/ EAG ESTIMATION; Future         SUBJECTIVE       Patient ID: Elías Godinez is a 41 y.o. female. Chief Complaint   Patient presents with   • Physical Exam       HISTORY OF PRESENT ILLNESS    Presents for check up. Reports concerns lately. Notes increased anxiety, finding herself emotional/tearful randomly, fatigued (but admits to poor sleep), stressed and overwhelmed at times, increased excessive sweating, recent unusual hair growth (chest wall and neck), decreased focus and difficulty with word finding at times. She has random joint pains - especially in the morning when first waking. Reports she feels "crazy" sometimes. No other memory issues. 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 Does admit to increased stress. She and her family moved here from [place] 1.5 years ago. It has not been as they expected, but cannot afford to return home at this time. Does miss her friends and support that she had there. Has Mirena IUD - placed one year ago        The following portions of the patient's history were reviewed and updated as appropriate: allergies, current medications, past family history, past medical history, past social history, past surgical history, and problem list.    REVIEW OF SYSTEMS  Review of Systems   Constitutional: Positive for activity change, diaphoresis, fatigue and unexpected weight change. Negative for appetite change and fever. Respiratory: Negative. Cardiovascular: Negative. Gastrointestinal: Negative. Genitourinary: Negative. Musculoskeletal: Positive for arthralgias. Neurological: Negative for weakness.    Psychiatric/Behavioral: Positive for decreased concentration and sleep disturbance. Negative for self-injury and suicidal ideas. The patient is nervous/anxious. OBJECTIVE      VITAL SIGNS  /78 (BP Location: Right arm, Patient Position: Sitting, Cuff Size: Large)   Pulse 93   Temp 98.1 °F (36.7 °C) (Temporal)   Resp 20   Ht 5' 3.25" (1.607 m)   Wt 108 kg (237 lb)   LMP  (LMP Unknown) Comment: Mirena IUD  SpO2 98%   Breastfeeding Yes   BMI 41.65 kg/m²     CURRENT MEDICATIONS    Current Outpatient Medications:   •  Levonorgestrel (MIRENA) 20 MCG/DAY IUD, 1 each by Intrauterine route once , Disp: , Rfl:   •  Multiple Vitamin (MULTIVITAMIN ADULT PO), Take by mouth, Disp: , Rfl:   •  Prenatal Vit-DSS-Fe Cbn-FA (PRENATAL AD PO), Take by mouth (Patient not taking: Reported on 7/26/2023), Disp: , Rfl:       PHYSICAL EXAMINATION   Physical Exam  Vitals and nursing note reviewed. Constitutional:       General: She is not in acute distress. Appearance: Normal appearance. She is not ill-appearing. HENT:      Head: Normocephalic. Neck:      Thyroid: Thyromegaly present. Cardiovascular:      Rate and Rhythm: Normal rate and regular rhythm. Pulmonary:      Effort: Pulmonary effort is normal. No respiratory distress. Breath sounds: Normal breath sounds and air entry. Skin:     General: Skin is warm and dry. Neurological:      General: No focal deficit present. Mental Status: She is alert and oriented to person, place, and time. Psychiatric:         Attention and Perception: Attention normal.         Mood and Affect: Mood normal.         Behavior: Behavior normal.         Thought Content:  Thought content normal.         Judgment: Judgment normal.      Comments: Tearful at times during visit

## 2023-07-26 NOTE — ASSESSMENT & PLAN NOTE
Multiple symptoms discussed at length today. Note work up 9/2022 for excessive fatigue. Will recheck vitamins and thyroid today. Thyroid appears possible mildly enlarged today - will check US as well. Will r/o auto immune: check TONG, CRP, RF. Referral to Endo for consult and possible further work up for endocrine etiology. Advised to f/u with Gyn - IUD check  Discussed anxiety as possible component: may consider SNRI in future (once completed breast feeding) if work up (-). She has tried and failed with few SSRI: Lexapro, Sertraline, Celexa. Has not followed with Weight management yet - waiting to completely wean son from breast feeding - plan a t age 5 next month. F/U here in 2 weeks - review labs and imaging - annual physical at that time. F/U sooner with further problems/concerns.   Pt agreeable with this plan

## 2023-08-03 ENCOUNTER — APPOINTMENT (OUTPATIENT)
Dept: LAB | Facility: CLINIC | Age: 38
End: 2023-08-03

## 2023-08-03 DIAGNOSIS — R47.89 WORD FINDING DIFFICULTY: ICD-10-CM

## 2023-08-03 DIAGNOSIS — Z00.8 HEALTH EXAMINATION IN POPULATION SURVEY: ICD-10-CM

## 2023-08-03 DIAGNOSIS — Z13.0 SCREENING, ANEMIA, DEFICIENCY, IRON: ICD-10-CM

## 2023-08-03 DIAGNOSIS — M25.50 ARTHRALGIA, UNSPECIFIED JOINT: ICD-10-CM

## 2023-08-03 DIAGNOSIS — Z13.29 SCREENING FOR THYROID DISORDER: ICD-10-CM

## 2023-08-03 DIAGNOSIS — Z13.1 SCREENING FOR DIABETES MELLITUS: ICD-10-CM

## 2023-08-03 DIAGNOSIS — E55.9 VITAMIN D DEFICIENCY: ICD-10-CM

## 2023-08-03 DIAGNOSIS — R53.83 OTHER FATIGUE: ICD-10-CM

## 2023-08-03 DIAGNOSIS — R61 EXCESSIVE SWEATING: ICD-10-CM

## 2023-08-03 DIAGNOSIS — R74.8 INCREASED VITAMIN B12 LEVEL: ICD-10-CM

## 2023-08-03 LAB
25(OH)D3 SERPL-MCNC: 17.6 NG/ML (ref 30–100)
ALBUMIN SERPL BCP-MCNC: 3.6 G/DL (ref 3.5–5)
ALP SERPL-CCNC: 80 U/L (ref 46–116)
ALT SERPL W P-5'-P-CCNC: 35 U/L (ref 12–78)
ANA SER QL IA: POSITIVE
ANION GAP SERPL CALCULATED.3IONS-SCNC: 5 MMOL/L
AST SERPL W P-5'-P-CCNC: 19 U/L (ref 5–45)
BASOPHILS # BLD AUTO: 0.03 THOUSANDS/ÂΜL (ref 0–0.1)
BASOPHILS NFR BLD AUTO: 0 % (ref 0–1)
BILIRUB SERPL-MCNC: 0.35 MG/DL (ref 0.2–1)
BUN SERPL-MCNC: 11 MG/DL (ref 5–25)
CALCIUM SERPL-MCNC: 9.5 MG/DL (ref 8.3–10.1)
CHLORIDE SERPL-SCNC: 110 MMOL/L (ref 96–108)
CHOLEST SERPL-MCNC: 232 MG/DL
CO2 SERPL-SCNC: 25 MMOL/L (ref 21–32)
CREAT SERPL-MCNC: 0.79 MG/DL (ref 0.6–1.3)
CRP SERPL QL: 4.5 MG/L
EOSINOPHIL # BLD AUTO: 0.07 THOUSAND/ÂΜL (ref 0–0.61)
EOSINOPHIL NFR BLD AUTO: 1 % (ref 0–6)
ERYTHROCYTE [DISTWIDTH] IN BLOOD BY AUTOMATED COUNT: 13.2 % (ref 11.6–15.1)
EST. AVERAGE GLUCOSE BLD GHB EST-MCNC: 114 MG/DL
GFR SERPL CREATININE-BSD FRML MDRD: 96 ML/MIN/1.73SQ M
GLUCOSE P FAST SERPL-MCNC: 106 MG/DL (ref 65–99)
HBA1C MFR BLD: 5.6 %
HCT VFR BLD AUTO: 39.6 % (ref 34.8–46.1)
HDLC SERPL-MCNC: 34 MG/DL
HGB BLD-MCNC: 12.2 G/DL (ref 11.5–15.4)
IMM GRANULOCYTES # BLD AUTO: 0.01 THOUSAND/UL (ref 0–0.2)
IMM GRANULOCYTES NFR BLD AUTO: 0 % (ref 0–2)
LDLC SERPL CALC-MCNC: 158 MG/DL (ref 0–100)
LYMPHOCYTES # BLD AUTO: 2.5 THOUSANDS/ÂΜL (ref 0.6–4.47)
LYMPHOCYTES NFR BLD AUTO: 32 % (ref 14–44)
MCH RBC QN AUTO: 25.3 PG (ref 26.8–34.3)
MCHC RBC AUTO-ENTMCNC: 30.8 G/DL (ref 31.4–37.4)
MCV RBC AUTO: 82 FL (ref 82–98)
MONOCYTES # BLD AUTO: 0.48 THOUSAND/ÂΜL (ref 0.17–1.22)
MONOCYTES NFR BLD AUTO: 6 % (ref 4–12)
NEUTROPHILS # BLD AUTO: 4.69 THOUSANDS/ÂΜL (ref 1.85–7.62)
NEUTS SEG NFR BLD AUTO: 61 % (ref 43–75)
NONHDLC SERPL-MCNC: 198 MG/DL
NRBC BLD AUTO-RTO: 0 /100 WBCS
PLATELET # BLD AUTO: 298 THOUSANDS/UL (ref 149–390)
PMV BLD AUTO: 9.6 FL (ref 8.9–12.7)
POTASSIUM SERPL-SCNC: 4.2 MMOL/L (ref 3.5–5.3)
PROT SERPL-MCNC: 7.4 G/DL (ref 6.4–8.4)
RBC # BLD AUTO: 4.83 MILLION/UL (ref 3.81–5.12)
RHEUMATOID FACT SER QL LA: NEGATIVE
SODIUM SERPL-SCNC: 140 MMOL/L (ref 135–147)
TRIGL SERPL-MCNC: 200 MG/DL
TSH SERPL DL<=0.05 MIU/L-ACNC: 2.28 UIU/ML (ref 0.45–4.5)
VIT B12 SERPL-MCNC: 672 PG/ML (ref 180–914)
WBC # BLD AUTO: 7.78 THOUSAND/UL (ref 4.31–10.16)

## 2023-08-03 PROCEDURE — 80061 LIPID PANEL: CPT

## 2023-08-03 PROCEDURE — 86430 RHEUMATOID FACTOR TEST QUAL: CPT

## 2023-08-03 PROCEDURE — 83036 HEMOGLOBIN GLYCOSYLATED A1C: CPT

## 2023-08-03 PROCEDURE — 80053 COMPREHEN METABOLIC PANEL: CPT

## 2023-08-03 PROCEDURE — 86140 C-REACTIVE PROTEIN: CPT

## 2023-08-03 PROCEDURE — 36415 COLL VENOUS BLD VENIPUNCTURE: CPT

## 2023-08-03 PROCEDURE — 86039 ANTINUCLEAR ANTIBODIES (ANA): CPT

## 2023-08-03 PROCEDURE — 84443 ASSAY THYROID STIM HORMONE: CPT

## 2023-08-03 PROCEDURE — 82306 VITAMIN D 25 HYDROXY: CPT

## 2023-08-03 PROCEDURE — 82607 VITAMIN B-12: CPT

## 2023-08-03 PROCEDURE — 85025 COMPLETE CBC W/AUTO DIFF WBC: CPT

## 2023-08-03 PROCEDURE — 86038 ANTINUCLEAR ANTIBODIES: CPT

## 2023-08-05 LAB
ANA HOMOGEN SER QL IF: NORMAL
ANA HOMOGEN TITR SER: NORMAL {TITER}

## 2023-08-08 ENCOUNTER — HOSPITAL ENCOUNTER (OUTPATIENT)
Dept: ULTRASOUND IMAGING | Facility: HOSPITAL | Age: 38
Discharge: HOME | End: 2023-08-08
Payer: COMMERCIAL

## 2023-08-08 DIAGNOSIS — E04.9 ENLARGED THYROID: ICD-10-CM

## 2023-08-08 PROCEDURE — 76536 US EXAM OF HEAD AND NECK: CPT

## 2023-08-16 ENCOUNTER — CONSULT (OUTPATIENT)
Dept: ENDOCRINOLOGY | Facility: CLINIC | Age: 38
End: 2023-08-16
Payer: COMMERCIAL

## 2023-08-16 VITALS
SYSTOLIC BLOOD PRESSURE: 118 MMHG | DIASTOLIC BLOOD PRESSURE: 74 MMHG | BODY MASS INDEX: 41.64 KG/M2 | HEART RATE: 80 BPM | WEIGHT: 235 LBS | HEIGHT: 63 IN

## 2023-08-16 DIAGNOSIS — M25.50 ARTHRALGIA, UNSPECIFIED JOINT: ICD-10-CM

## 2023-08-16 DIAGNOSIS — L68.0 HIRSUTISM: ICD-10-CM

## 2023-08-16 DIAGNOSIS — R61 EXCESSIVE SWEATING: ICD-10-CM

## 2023-08-16 DIAGNOSIS — R53.83 OTHER FATIGUE: Primary | ICD-10-CM

## 2023-08-16 DIAGNOSIS — G47.30 SLEEP APNEA, UNSPECIFIED TYPE: ICD-10-CM

## 2023-08-16 DIAGNOSIS — E55.9 VITAMIN D DEFICIENCY: ICD-10-CM

## 2023-08-16 PROCEDURE — 99244 OFF/OP CNSLTJ NEW/EST MOD 40: CPT | Performed by: INTERNAL MEDICINE

## 2023-08-16 RX ORDER — DEXAMETHASONE 1 MG
TABLET ORAL
Qty: 1 TABLET | Refills: 0 | Status: SHIPPED | OUTPATIENT
Start: 2023-08-16 | End: 2023-08-23 | Stop reason: ALTCHOICE

## 2023-08-16 NOTE — PROGRESS NOTES
8/16/2023      Assessment/Plan:   #Weight gain  R/o Endocrine related causes. Check 1 mg dexamethasone suppression test to rule out hypercortisolism. Check free and total testosterone to rule out hyperandrogenism. #Vit D deficiency  Start on high dose vit D 50,000 units weekly for 8 weeks and then continue on maintenance dose of 5000 units vitamin D daily. #Fatigue  Check iron panel  #YESSICA  Ambulatory referral to sleep medicine for sleep study. CC: Weight gain, arthralgias    History of Present Illness     HPI: Marcial Bryant is a 41y.o. year old female with history of hyperlipidemia, obesity, anxiety and depression seen in initial consult at the request of PCP for arthralgias and inability to lose weight. Patient reports multiple complaints including generalized fatigue, weight gain, worsening hirsutism, excessive sweats and arthralgias within the past year. She reported above complaints of PCP and had blood work done which showed low vitamin D and positive TONG. TSH check was normal.    Reports thick dark curly hair in her chin, chest, abdomen and inner thighs requiring frequent hair plucking. Also reports at least 25lb weight gain in the past year despite no changes in eating habits. Endorsed history of recent stressors including relocation from [place] to [place] over a year ago. Was previously on Wellbutrin which is on hold since she is currently breast-feeding. Denies any easy bruising or purplish striae. Denies any proximal muscle weakness. Denies any prior diagnosis of PCOS. No significant history of irregular periods, however has been on and off birth control for the last 20 years. Has 3 kids within the past 9 years. Takes daily multivitamins - unsure if it contains Vit D.         ROS:  Constitutional: Fatigue   Respiratory: Negative for shortness of breath, wheezing, cough. Cardiovascular: Negative for chest pain and palpitations.    Gastrointestinal: Negative for abdominal pain, nausea and vomiting. Negative for diarrhea or constipation. Musculoskeletal:  Arthralgias +  Neurological: Negative for dizziness, light-headedness and headaches. All other ROS reviewed and negative    Historical Information   History reviewed. No pertinent past medical history.   Past Surgical History:   Procedure Laterality Date   •  SECTION, LOW TRANSVERSE       Social History   Social History     Substance and Sexual Activity   Alcohol Use Not Currently     Social History     Substance and Sexual Activity   Drug Use Never     Social History     Tobacco Use   Smoking Status Former   • Packs/day: 0.25   • Years: 4.00   • Total pack years: 1.00   • Types: Cigarettes   • Start date: 2005   • Quit date: 2009   • Years since quittin.2   Smokeless Tobacco Never   Tobacco Comments    on and off throughout college     Family History:   Family History   Problem Relation Age of Onset   • Hypertension Mother    • Hyperlipidemia Mother    • Obesity Mother    • Transient ischemic attack Mother    • Thyroid disease unspecified Mother         [email or web address]   • Osteoporosis Mother    • Hypertension Father    • Hyperlipidemia Father    • Heart failure Maternal Grandfather    • Stroke Maternal Grandfather    • Osteoporosis Maternal Grandmother    • Hyperlipidemia Brother        Meds/Allergies   Current Outpatient Medications   Medication Sig Dispense Refill   • Cholecalciferol 1.25 MG (00216 UT) TABS Take Vit D 51895 units weekly for 8 weeks and then continue on maintenance dose of Vit D 5000 units daily 8 tablet 0   • dexamethasone (DECADRON) 1 mg tablet Take 1 tablet at 10 pm night prior to cortisol lab draw 1 tablet 0   • Levonorgestrel (MIRENA) 20 MCG/DAY IUD 1 each by Intrauterine route once      • Multiple Vitamin (MULTIVITAMIN ADULT PO) Take by mouth     • Prenatal Vit-DSS-Fe Cbn-FA (PRENATAL AD PO) Take by mouth (Patient not taking: Reported on 2023)       No current facility-administered medications for this visit. Allergies   Allergen Reactions   • Sulfa Antibiotics Other (See Comments)     Mouth ulcers   Stomach ulcers         Objective   Vitals: Blood pressure 118/74, pulse 80, height 5' 3.25" (1.607 m), weight 107 kg (235 lb), currently breastfeeding. Invasive Devices     None                 Physical Exam     GEN: Not in acute distress, obese  HEENT: MM moist. No scleral icterus, neck circumference 17.5 inches  CV: RRR, nml S1/S2, no murmur appreciated  Lungs: CTA bilaterally  Skin: No rashes or lesions noted  Psych: intermittent episodes of tearfulness   Vitals reviewed. The history was obtained from the review of the chart and from the patient. Lab Results:      Recent Results (from the past 08608 hour(s))   Poct Covid 19 Rapid Antigen Test    Collection Time: 07/08/22  6:07 PM   Result Value Ref Range    POCT SARS-CoV-2 Ag Negative Negative    VALID CONTROL Valid    Lipid panel    Collection Time: 09/09/22  9:46 AM   Result Value Ref Range    Cholesterol 245 (H) See Comment mg/dL    Triglycerides 207 (H) See Comment mg/dL    HDL, Direct 35 (L) >=50 mg/dL    LDL Calculated 169 (H) 0 - 100 mg/dL    Non-HDL-Chol (CHOL-HDL) 210 mg/dl   Hemoglobin A1C    Collection Time: 09/09/22  9:46 AM   Result Value Ref Range    Hemoglobin A1C 5.4 Normal 3.8-5.6%; PreDiabetic 5.7-6.4%;  Diabetic >=6.5%; Glycemic control for adults with diabetes <7.0% %     mg/dl   Vitamin B12    Collection Time: 10/19/22 11:02 AM   Result Value Ref Range    Vitamin B-12 1,035 (H) 100 - 900 pg/mL   Vitamin D 25 hydroxy    Collection Time: 10/19/22 11:02 AM   Result Value Ref Range    Vit D, 25-Hydroxy 23.8 (L) 30.0 - 100.0 ng/mL   Comprehensive metabolic panel    Collection Time: 10/19/22 11:02 AM   Result Value Ref Range    Sodium 138 135 - 147 mmol/L    Potassium 4.0 3.5 - 5.3 mmol/L    Chloride 108 96 - 108 mmol/L    CO2 26 21 - 32 mmol/L    ANION GAP 4 4 - 13 mmol/L    BUN 13 5 - 25 mg/dL    Creatinine 0.79 0.60 - 1.30 mg/dL    Glucose, Fasting 90 65 - 99 mg/dL    Calcium 9.3 8.3 - 10.1 mg/dL    AST 17 5 - 45 U/L    ALT 37 12 - 78 U/L    Alkaline Phosphatase 104 46 - 116 U/L    Total Protein 7.8 6.4 - 8.4 g/dL    Albumin 3.8 3.5 - 5.0 g/dL    Total Bilirubin 0.29 0.20 - 1.00 mg/dL    eGFR 97 ml/min/1.73sq m   CBC and differential    Collection Time: 10/19/22 11:02 AM   Result Value Ref Range    WBC 7.84 4.31 - 10.16 Thousand/uL    RBC 5.16 (H) 3.81 - 5.12 Million/uL    Hemoglobin 13.3 11.5 - 15.4 g/dL    Hematocrit 42.9 34.8 - 46.1 %    MCV 83 82 - 98 fL    MCH 25.8 (L) 26.8 - 34.3 pg    MCHC 31.0 (L) 31.4 - 37.4 g/dL    RDW 13.7 11.6 - 15.1 %    MPV 10.0 8.9 - 12.7 fL    Platelets 470 796 - 007 Thousands/uL    nRBC 0 /100 WBCs    Neutrophils Relative 60 43 - 75 %    Immat GRANS % 0 0 - 2 %    Lymphocytes Relative 33 14 - 44 %    Monocytes Relative 6 4 - 12 %    Eosinophils Relative 1 0 - 6 %    Basophils Relative 0 0 - 1 %    Neutrophils Absolute 4.63 1.85 - 7.62 Thousands/µL    Immature Grans Absolute 0.03 0.00 - 0.20 Thousand/uL    Lymphocytes Absolute 2.61 0.60 - 4.47 Thousands/µL    Monocytes Absolute 0.45 0.17 - 1.22 Thousand/µL    Eosinophils Absolute 0.09 0.00 - 0.61 Thousand/µL    Basophils Absolute 0.03 0.00 - 0.10 Thousands/µL   TSH, 3rd generation with Free T4 reflex    Collection Time: 10/19/22 11:02 AM   Result Value Ref Range    TSH 3RD GENERATON 1.820 0.450 - 4.500 uIU/mL   Hepatitis C antibody    Collection Time: 10/19/22 11:02 AM   Result Value Ref Range    Hepatitis C Ab Non-reactive Non-reactive   HIV 1/2 Antigen/Antibody (4th Generation) w Reflex [facility]    Collection Time: 10/19/22 11:02 AM   Result Value Ref Range    HIV-1/HIV-2 Ab Non-Reactive Non-Reactive   Iron Saturation %    Collection Time: 10/19/22 11:02 AM   Result Value Ref Range    Iron Saturation 20 15 - 50 %    TIBC 324 250 - 450 ug/dL    Iron 66 50 - 170 ug/dL   Ferritin    Collection Time: 10/19/22 11:02 AM   Result Value Ref Range    Ferritin 58 8 - 388 ng/mL   Vitamin D 25 hydroxy    Collection Time: 08/03/23  7:40 AM   Result Value Ref Range    Vit D, 25-Hydroxy 17.6 (L) 30.0 - 100.0 ng/mL   Vitamin B12    Collection Time: 08/03/23  7:40 AM   Result Value Ref Range    Vitamin B-12 672 180 - 914 pg/mL   TSH, 3rd generation with Free T4 reflex    Collection Time: 08/03/23  7:40 AM   Result Value Ref Range    TSH 3RD GENERATON 2.280 0.450 - 4.500 uIU/mL   CBC and differential    Collection Time: 08/03/23  7:40 AM   Result Value Ref Range    WBC 7.78 4.31 - 10.16 Thousand/uL    RBC 4.83 3.81 - 5.12 Million/uL    Hemoglobin 12.2 11.5 - 15.4 g/dL    Hematocrit 39.6 34.8 - 46.1 %    MCV 82 82 - 98 fL    MCH 25.3 (L) 26.8 - 34.3 pg    MCHC 30.8 (L) 31.4 - 37.4 g/dL    RDW 13.2 11.6 - 15.1 %    MPV 9.6 8.9 - 12.7 fL    Platelets 358 452 - 742 Thousands/uL    nRBC 0 /100 WBCs    Neutrophils Relative 61 43 - 75 %    Immat GRANS % 0 0 - 2 %    Lymphocytes Relative 32 14 - 44 %    Monocytes Relative 6 4 - 12 %    Eosinophils Relative 1 0 - 6 %    Basophils Relative 0 0 - 1 %    Neutrophils Absolute 4.69 1.85 - 7.62 Thousands/µL    Immature Grans Absolute 0.01 0.00 - 0.20 Thousand/uL    Lymphocytes Absolute 2.50 0.60 - 4.47 Thousands/µL    Monocytes Absolute 0.48 0.17 - 1.22 Thousand/µL    Eosinophils Absolute 0.07 0.00 - 0.61 Thousand/µL    Basophils Absolute 0.03 0.00 - 0.10 Thousands/µL   Comprehensive metabolic panel    Collection Time: 08/03/23  7:40 AM   Result Value Ref Range    Sodium 140 135 - 147 mmol/L    Potassium 4.2 3.5 - 5.3 mmol/L    Chloride 110 (H) 96 - 108 mmol/L    CO2 25 21 - 32 mmol/L    ANION GAP 5 mmol/L    BUN 11 5 - 25 mg/dL    Creatinine 0.79 0.60 - 1.30 mg/dL    Glucose, Fasting 106 (H) 65 - 99 mg/dL    Calcium 9.5 8.3 - 10.1 mg/dL    AST 19 5 - 45 U/L    ALT 35 12 - 78 U/L    Alkaline Phosphatase 80 46 - 116 U/L    Total Protein 7.4 6.4 - 8.4 g/dL    Albumin 3.6 3.5 - 5.0 g/dL    Total Bilirubin 0.35 0.20 - 1.00 mg/dL    eGFR 96 ml/min/1.73sq m   TONG Screen w/ Reflex to Titer/Pattern    Collection Time: 08/03/23  7:40 AM   Result Value Ref Range    TONG Positive (A) Negative   C-reactive protein    Collection Time: 08/03/23  7:40 AM   Result Value Ref Range    CRP 4.5 (H) <3.0 mg/L   RF Screen w/ Reflex to Titer    Collection Time: 08/03/23  7:40 AM   Result Value Ref Range    Rheumatoid Factor Negative Negative   Lipid panel    Collection Time: 08/03/23  7:40 AM   Result Value Ref Range    Cholesterol 232 (H) See Comment mg/dL    Triglycerides 200 (H) See Comment mg/dL    HDL, Direct 34 (L) >=50 mg/dL    LDL Calculated 158 (H) 0 - 100 mg/dL    Non-HDL-Chol (CHOL-HDL) 198 mg/dl   Hemoglobin A1C    Collection Time: 08/03/23  7:40 AM   Result Value Ref Range    Hemoglobin A1C 5.6 Normal 4.0-5.6%; PreDiabetic 5.7-6.4%; Diabetic >=6.5%; Glycemic control for adults with diabetes <7.0% %     mg/dl   TONG Titer (Reflex test-do not order)    Collection Time: 08/03/23  7:40 AM   Result Value Ref Range    TONG Titer 1 Titer of 40     TONG Pattern 1 Un-typeable pattern        Radiology  Narrative & Impression   THYROID ULTRASOUND     INDICATION:    E04.9: Nontoxic goiter, unspecified.     COMPARISON:  None     TECHNIQUE:   Ultrasound of the thyroid was performed with a high frequency linear transducer in transverse and sagittal planes including volumetric imaging sweeps as well as traditional still imaging technique.     FINDINGS:  Normal homogeneous smooth echotexture.     Right lobe: 4.5 x 1.4 x 1.6 cm. Volume 4.8 mL  Left lobe:  4.3 x 1.1 x 1.7 cm. Volume 3.6 mL  Isthmus: 0.3  cm.     No thyroid nodules are demonstrated.     IMPRESSION:     Normal examination.           Reference: ACR Thyroid Imaging, Reporting and Data System (TI-RADS): White Paper of the [employer].  J AM Estela Radiol 8299;26:[id number]. (additional recommendations based on American Thyroid Association 2015 guidelines.)        Workstation performed: [id number] Future Appointments   Date Time Provider [address]   8/23/2023  2:40 PM INA [facility]   10/23/2023  8:15 AM Mingo Capone MD OBGYN [facility]   11/21/2023  2:00 PM Juan Luis Ray MD [facility]   1/4/2024  2:45 PM INA Are[facility]       Portions of the record may have been created with voice recognition software. Occasional wrong word or "sound a like" substitutions may have occurred due to the inherent limitations of voice recognition software. Read the chart carefully and recognize, using context, where substitutions have occurred.

## 2023-08-17 ENCOUNTER — TELEPHONE (OUTPATIENT)
Dept: BARIATRICS | Facility: CLINIC | Age: 38
End: 2023-08-17

## 2023-08-17 NOTE — TELEPHONE ENCOUNTER
Left voice message for patient to call back [phone] to reschedule canceled appointment with a [facility] provider.

## 2023-08-21 ENCOUNTER — APPOINTMENT (OUTPATIENT)
Dept: LAB | Facility: HOSPITAL | Age: 38
End: 2023-08-21
Payer: COMMERCIAL

## 2023-08-21 DIAGNOSIS — L68.0 HIRSUTISM: ICD-10-CM

## 2023-08-21 DIAGNOSIS — R53.83 OTHER FATIGUE: ICD-10-CM

## 2023-08-21 DIAGNOSIS — M25.50 ARTHRALGIA, UNSPECIFIED JOINT: ICD-10-CM

## 2023-08-21 LAB
CORTIS SERPL-MCNC: 0.8 UG/DL
FERRITIN SERPL-MCNC: 18 NG/ML (ref 11–307)
IRON SATN MFR SERPL: 15 % (ref 15–50)
IRON SERPL-MCNC: 53 UG/DL (ref 50–170)
TIBC SERPL-MCNC: 346 UG/DL (ref 250–450)

## 2023-08-21 PROCEDURE — 36415 COLL VENOUS BLD VENIPUNCTURE: CPT

## 2023-08-21 PROCEDURE — 83550 IRON BINDING TEST: CPT

## 2023-08-21 PROCEDURE — 82533 TOTAL CORTISOL: CPT

## 2023-08-21 PROCEDURE — 84403 ASSAY OF TOTAL TESTOSTERONE: CPT

## 2023-08-21 PROCEDURE — 82728 ASSAY OF FERRITIN: CPT

## 2023-08-21 PROCEDURE — 84402 ASSAY OF FREE TESTOSTERONE: CPT

## 2023-08-21 PROCEDURE — 83540 ASSAY OF IRON: CPT

## 2023-08-22 ENCOUNTER — OFFICE VISIT (OUTPATIENT)
Dept: BARIATRICS | Facility: CLINIC | Age: 38
End: 2023-08-22
Payer: COMMERCIAL

## 2023-08-22 VITALS
SYSTOLIC BLOOD PRESSURE: 114 MMHG | HEIGHT: 63 IN | HEART RATE: 77 BPM | DIASTOLIC BLOOD PRESSURE: 77 MMHG | RESPIRATION RATE: 16 BRPM | WEIGHT: 240 LBS | BODY MASS INDEX: 42.52 KG/M2

## 2023-08-22 DIAGNOSIS — E66.9 OBESITY, CLASS II, BMI 35-39.9: Primary | ICD-10-CM

## 2023-08-22 DIAGNOSIS — F41.9 ANXIETY: ICD-10-CM

## 2023-08-22 PROBLEM — E66.01 OBESITY, CLASS III, BMI 40-49.9 (MORBID OBESITY) (HCC): Status: ACTIVE | Noted: 2022-10-18

## 2023-08-22 PROBLEM — E66.813 OBESITY, CLASS III, BMI 40-49.9 (MORBID OBESITY): Status: ACTIVE | Noted: 2022-10-18

## 2023-08-22 PROCEDURE — 99214 OFFICE O/P EST MOD 30 MIN: CPT | Performed by: PHYSICIAN ASSISTANT

## 2023-08-22 RX ORDER — CHOLECALCIFEROL (VITAMIN D3) 1250 MCG
CAPSULE ORAL
COMMUNITY
Start: 2023-08-16

## 2023-08-22 NOTE — PATIENT INSTRUCTIONS
Weight Management   WHAT YOU NEED TO KNOW:   Why is important to manage my weight? Being overweight increases your risk of health conditions such as heart disease, high blood pressure, type 2 diabetes, and certain types of cancer. It can also increase your risk for osteoarthritis, sleep apnea, and other respiratory problems. Aim for a slow, steady weight loss. Even a small amount of weight loss can lower your risk of health problems. What are the risks of being overweight? Extra weight can cause many health problems, including the following:  Diabetes (high blood sugar level)    High blood pressure or high cholesterol    Heart disease    Stroke    Gallbladder or liver disease    Cancer of the colon, breast, prostate, liver, or kidney    Sleep apnea    Arthritis or gout    What do I need to know about screening? Screening is done to check for health conditions before you have signs or symptoms. If you are 28to 79years old, your blood sugar level may be checked every 3 years for signs of prediabetes or diabetes. Your healthcare provider will check your blood pressure at each visit. High blood pressure can lead to a stroke or other problems. Your provider may check for signs of heart disease, cancer, or other health problems. How do I lose weight safely? A safe and healthy way to lose weight is to eat fewer calories and get regular exercise. You can lose up about 1 pound a week by decreasing the number of calories you eat by 500 calories each day. You can decrease calories by eating smaller portion sizes or by cutting out high-calorie foods. Read labels to find out how many calories are in the foods you eat. You can also burn calories with exercise such as walking, swimming, or biking. You will be more likely to keep weight off if you make these changes part of your lifestyle. Exercise at least 30 minutes per day on most days of the week.  You can also fit in more physical activity by taking the stairs instead of the elevator or parking farther away from stores. Ask your healthcare provider about the best exercise plan for you. What is a healthy meal plan that can help me manage my weight? A healthy meal plan includes a variety of foods, contains fewer calories, and helps you stay healthy. A healthy meal plan includes the following:     Eat whole-grain foods more often. A healthy meal plan should contain fiber. Fiber is the part of grains, fruits, and vegetables that is not broken down by your body. Whole-grain foods are healthy and provide extra fiber in your diet. Some examples of whole-grain foods are whole-wheat breads and pastas, oatmeal, brown rice, and bulgur. Eat a variety of vegetables every day. Include dark, leafy greens such as spinach, kale, lonny greens, and mustard greens. Eat yellow and orange vegetables such as carrots, sweet potatoes, and winter squash. Eat a variety of fruits every day. Choose fresh or canned fruit (canned in its own juice or light syrup) instead of juice. Fruit juice has very little or no fiber. Eat low-fat dairy foods. Drink fat-free (skim) milk or 1% milk. Eat fat-free yogurt and low-fat cottage cheese. Try low-fat cheeses such as mozzarella and other reduced-fat cheeses. Choose meat and other protein foods that are low in fat. Choose beans or other legumes such as split peas or lentils. Choose fish, skinless poultry (chicken or turkey), or lean cuts of red meat (beef or pork). Before you cook meat or poultry, cut off any visible fat. Use less fat and oil. Try baking foods instead of frying them. Add less fat, such as margarine, sour cream, regular salad dressing and mayonnaise to foods. Eat fewer high-fat foods. Some examples of high-fat foods include french fries, doughnuts, ice cream, and cakes. Eat fewer sweets. Limit foods and drinks that are high in sugar. This includes candy, cookies, regular soda, and sweetened drinks.   What are some ways I can decrease calories? Eat smaller portions. Use a small plate with smaller servings. Do not eat second helpings. When you eat at a restaurant, ask for a box and place half of your meal in the box before you eat. Share an entrée with someone else. Replace high-calorie snacks with healthy, low-calorie snacks. Choose fresh fruit, vegetables, fat-free rice cakes, or air-popped popcorn instead of potato chips, nuts, or chocolate. Choose water or calorie-free drinks instead of soda or sweetened drinks. Do not shop for groceries when you are hungry. You may be more likely to make unhealthy food choices. Take a grocery list of healthy foods and shop after you have eaten. Eat regular meals. Do not skip meals. Skipping meals can lead to overeating later in the day. This can make it harder for you to lose weight. Eat a healthy snack in place of a meal if you do not have time to eat a regular meal. Talk with a dietitian to help you create a meal plan and schedule that is right for you. What other things should I consider as I try to lose weight? Be aware of situations that may give you the urge to overeat, such as eating while watching television. Find ways to avoid these situations. For example, read a book, go for a walk, or do crafts. Meet with a weight loss support group or friends who are also trying to lose weight. This may help you stay motivated to continue working on your weight loss goals. CARE AGREEMENT:   You have the right to help plan your care. Learn about your health condition and how it may be treated. Discuss treatment options with your healthcare providers to decide what care you want to receive. You always have the right to refuse treatment. The above information is an  only. It is not intended as medical advice for individual conditions or treatments.  Talk to your doctor, nurse or pharmacist before following any medical regimen to see if it is safe and effective for you. © Copyright Loletta Gosselin 2022 Information is for End User's use only and may not be sold, redistributed or otherwise used for commercial purposes.

## 2023-08-22 NOTE — ASSESSMENT & PLAN NOTE
-Discussed options of Healthy[facility]-Intensive Lifestyle Intervention Program, Very Low Calorie Diet-VLCD, Conservative Program, Cristino-En-Y Gastric Bypass and Vertical Sleeve Gastrectomy and the role of weight loss medications. Explained the importance of making lifestyle changes if utilizing medication to aid in weight loss  -Initial weight loss goal of 5-10% weight loss for improved health  -Screening labs and records reviewed from prior    -Patient is interested in pursuing Conservative Program with RD meal planning  -Calorie goals (1500), sample menu (5313-7070 calories), portion size guidelines, and food logging reviewed with the patient. Goals:  -Food log (ie.) [email or web address] caloreis  - To drink at least 64oz of water daily. No sugary beverages.  -continue to try to increase exercise. Discussed trying to do resistance training as well    Discussed medication options and risk/benefits. Will start on wegovy. Patient denies personal and family history of MCT and MEN2 tumors. Patient denies personal history of pancreatitis. Side effects discussed but not limited to diarrhea, bloating, constipation, GI upset, heartburn, increased heart rate, headache, low blood sugar, fatigue and dizziness. Titration and medication administration discussed.   Contraception: IUD  Medication contract signed    Initial Weight:240lb

## 2023-08-22 NOTE — PROGRESS NOTES
Assessment/Plan:    Obesity, Class III, BMI 40-49.9 (morbid obesity) ([address])  -Discussed options of [facility]-Intensive Lifestyle Intervention Program, Very Low Calorie Diet-VLCD, Conservative Program, Cristino-En-Y Gastric Bypass and Vertical Sleeve Gastrectomy and the role of weight loss medications. Explained the importance of making lifestyle changes if utilizing medication to aid in weight loss  -Initial weight loss goal of 5-10% weight loss for improved health  -Screening labs and records reviewed from prior    -Patient is interested in pursuing Conservative Program with RD meal planning  -Calorie goals (1500), sample menu (1690-9451 calories), portion size guidelines, and food logging reviewed with the patient. Goals:  -Food log (ie.) [email or web address]-1500 caloreis  - To drink at least 64oz of water daily. No sugary beverages.  -continue to try to increase exercise. Discussed trying to do resistance training as well    Discussed medication options and risk/benefits. Will start on wegovy. Patient denies personal and family history of MCT and MEN2 tumors. Patient denies personal history of pancreatitis. Side effects discussed but not limited to diarrhea, bloating, constipation, GI upset, heartburn, increased heart rate, headache, low blood sugar, fatigue and dizziness. Titration and medication administration discussed. Contraception: IUD  Medication contract signed    Initial Weight:240lb            Anxiety  Plans on talking to PCP tomorrow on restarting wellbutrin. No longer breastfeeding        Follow up in approximately 1 month RD visit, 2 month weight check and 4 month  with Non-Surgical Physician/Advanced Practitioner. Diagnoses and all orders for this visit:    Obesity, Class II, BMI 35-39.9  -     Semaglutide-Weight Management (WEGOVY) 0.25 MG/0.5ML;  Inject 0.5 mL (0.25 mg total) under the skin once a week    Anxiety    Other orders  -     Cholecalciferol (Vitamin D3) 1.25 MG (58771 UT) CAPS; PLEASE SEE ATTACHED FOR DETAILED DIRECTIONS          Subjective:   Chief Complaint   Patient presents with   • Follow-up     MWM OD f/u, waist 43.5in        Patient ID: Geno Garcia  is a 41 y.o. female with excess weight/obesity here to pursue weight managment. Patient is pursuing Conservative Program.     HPI  Here for followup from consult in December. At that time she was breastfeeding. She did lose a good amount of weight after college and was on adipex. After 2nd child did do [employer] but hard a hard time maintaining weight. Right now kitchen is getting remodeled    She did just seen endocrinology and PCP and had recently labs done. She did have low vitamin d and is on supplement. Also had elevated CRP and positive TONG. She was on wellbutrin in the passt.   Wt Readings from Last 10 Encounters:   08/22/23 109 kg (240 lb)   08/16/23 107 kg (235 lb)   07/26/23 108 kg (237 lb)   12/05/22 106 kg (233 lb 9.6 oz)   10/18/22 103 kg (228 lb)   09/06/22 104 kg (230 lb)   06/15/22 102 kg (225 lb 3.2 oz)       Food logging:tracks on  [employer] and myfitnesspal. Calories 5305-2702   Increased appetite/cravings:  Fruit/Vegetable servings:  Exercise:exercise bike  Hydration:diet soda, coffee w/creamer, muscle milk protein shake, at least 64 oz  Occupation: working day shift, sedentary usually    Diet recall:  Coffee w/creamer. eggwhite veggie omlet no cheese  Salad bar w/meat and vegetables  Protein,vegetable, starch  Popcorn    Colonoscopy-Not applicable    The following portions of the patient's history were reviewed and updated as appropriate:   She  has no past medical history on file.   She   Patient Active Problem List    Diagnosis Date Noted   • Hirsutism 08/16/2023   • Vitamin D deficiency 08/16/2023   • Sleep apnea 08/16/2023   • Arthralgia 07/26/2023   • Hyperlipidemia 10/18/2022   • Other fatigue 10/18/2022   • Obesity, Class III, BMI 40-49.9 (morbid obesity) ([address]) 10/18/2022   • Anxiety 10/18/2022   • Encounter for surveillance of abnormal nevi 10/18/2022   • IUD (intrauterine device) in place 2022     She  has a past surgical history that includes  section, low transverse. Her family history includes Heart failure in her maternal grandfather; Hyperlipidemia in her brother, father, and mother; Hypertension in her father and mother; Obesity in her mother; Osteoporosis in her maternal grandmother and mother; Stroke in her maternal grandfather; Thyroid disease unspecified in her mother; Transient ischemic attack in her mother. She  reports that she quit smoking about 14 years ago. Her smoking use included cigarettes. She started smoking about 17 years ago. She has a 1.00 pack-year smoking history. She has never used smokeless tobacco. She reports that she does not currently use alcohol. She reports that she does not use drugs. Current Outpatient Medications   Medication Sig Dispense Refill   • Cholecalciferol (Vitamin D3) 1.25 MG (18456 UT) CAPS PLEASE SEE ATTACHED FOR DETAILED DIRECTIONS     • Cholecalciferol 1.25 MG (98209 UT) TABS Take Vit D 71308 units weekly for 8 weeks and then continue on maintenance dose of Vit D 5000 units daily 8 tablet 0   • Levonorgestrel (MIRENA) 20 MCG/DAY IUD 1 each by Intrauterine route once      • Multiple Vitamin (MULTIVITAMIN ADULT PO) Take by mouth     • Semaglutide-Weight Management (WEGOVY) 0.25 MG/0.5ML Inject 0.5 mL (0.25 mg total) under the skin once a week 2 mL 0   • dexamethasone (DECADRON) 1 mg tablet Take 1 tablet at 10 pm night prior to cortisol lab draw 1 tablet 0     No current facility-administered medications for this visit.      Current Outpatient Medications on File Prior to Visit   Medication Sig   • Cholecalciferol (Vitamin D3) 1.25 MG (91581 UT) CAPS PLEASE SEE ATTACHED FOR DETAILED DIRECTIONS   • Cholecalciferol 1.25 MG (72605 UT) TABS Take Vit D 39385 units weekly for 8 weeks and then continue on maintenance dose of Vit D 5000 units daily   • Levonorgestrel (MIRENA) 20 MCG/DAY IUD 1 each by Intrauterine route once    • Multiple Vitamin (MULTIVITAMIN ADULT PO) Take by mouth   • dexamethasone (DECADRON) 1 mg tablet Take 1 tablet at 10 pm night prior to cortisol lab draw   • [DISCONTINUED] Prenatal Vit-DSS-Fe Cbn-FA (PRENATAL AD PO) Take by mouth (Patient not taking: Reported on 7/26/2023)     No current facility-administered medications on file prior to visit. .    Review of Systems   Constitutional: Positive for fatigue. Negative for fever. Respiratory: Negative for shortness of breath. Cardiovascular: Negative for chest pain and palpitations. Gastrointestinal: Negative for abdominal pain, constipation, diarrhea and vomiting. Genitourinary: Negative for difficulty urinating. Musculoskeletal: Positive for arthralgias. Skin: Negative for rash. Neurological: Negative for headaches. Psychiatric/Behavioral: Negative for dysphoric mood. The patient is nervous/anxious. Objective:    /77   Pulse 77   Resp 16   Ht 5' 3" (1.6 m)   Wt 109 kg (240 lb)   LMP  (LMP Unknown) Comment: Mirena IUD  Breastfeeding No   BMI 42.51 kg/m²      Physical Exam  Vitals and nursing note reviewed. Constitutional:       General: She is not in acute distress. Appearance: She is well-developed. She is obese. HENT:      Head: Normocephalic and atraumatic. Eyes:      Conjunctiva/sclera: Conjunctivae normal.   Neck:      Thyroid: No thyromegaly. Pulmonary:      Effort: Pulmonary effort is normal. No respiratory distress. Skin:     Findings: No rash (visible). Neurological:      Mental Status: She is alert and oriented to person, place, and time.    Psychiatric:         Behavior: Behavior normal.

## 2023-08-23 ENCOUNTER — TELEPHONE (OUTPATIENT)
Dept: BARIATRICS | Facility: CLINIC | Age: 38
End: 2023-08-23

## 2023-08-23 ENCOUNTER — OFFICE VISIT (OUTPATIENT)
Dept: FAMILY MEDICINE CLINIC | Facility: CLINIC | Age: 38
End: 2023-08-23
Payer: COMMERCIAL

## 2023-08-23 VITALS
OXYGEN SATURATION: 98 % | WEIGHT: 240.8 LBS | HEIGHT: 63 IN | DIASTOLIC BLOOD PRESSURE: 80 MMHG | HEART RATE: 86 BPM | TEMPERATURE: 97.3 F | SYSTOLIC BLOOD PRESSURE: 130 MMHG | BODY MASS INDEX: 42.66 KG/M2

## 2023-08-23 DIAGNOSIS — E78.5 HYPERLIPIDEMIA, UNSPECIFIED HYPERLIPIDEMIA TYPE: ICD-10-CM

## 2023-08-23 DIAGNOSIS — R76.8 POSITIVE ANA (ANTINUCLEAR ANTIBODY): ICD-10-CM

## 2023-08-23 DIAGNOSIS — Z00.00 ANNUAL PHYSICAL EXAM: Primary | ICD-10-CM

## 2023-08-23 LAB
TESTOST FREE SERPL-MCNC: <0.2 PG/ML (ref 0–4.2)
TESTOST SERPL-MCNC: <3 NG/DL (ref 8–60)

## 2023-08-23 PROCEDURE — 99395 PREV VISIT EST AGE 18-39: CPT | Performed by: NURSE PRACTITIONER

## 2023-08-23 NOTE — ASSESSMENT & PLAN NOTE
Currently following with weight management  Started on Wegovy-tolerating  Eating healthier and increasing exercise  Recent lipid panel reviewed  Total: 232; LDL: 158  Hopeful for improvement with lifestyle changes above  Recheck lipid panel 6 months No

## 2023-08-23 NOTE — TELEPHONE ENCOUNTER
Patient and pharmacy was informed regarding the wegovy approavl from 8/22/2023-8/22/2024. She was also informed about the 4-5% wt loss requirement for renewal purposes.

## 2023-08-23 NOTE — PROGRESS NOTES
ADULT ANNUAL [facility]    NAME: Val River  AGE: 41 y.o. SEX: female  : 1985     DATE: 2023     Assessment and Plan:   Comprehensive physical exam  PMH and chronic conditions reviewed  Medications reconciled  Preventative care and recommended screenings as below   Lab work reviewed   Feels better since last visit- fatigue improving   High-dose vitamin D supplement  Continue annual physicals  Follow-up sooner as needed  Problem List Items Addressed This Visit        Other    Hyperlipidemia     Currently following with weight management  Started on Wegovy-tolerating  Eating healthier and increasing exercise  Recent lipid panel reviewed  Total: 232; LDL: 158  Hopeful for improvement with lifestyle changes above  Recheck lipid panel 6 months         Relevant Orders    Lipid panel    Positive TONG (antinuclear antibody)    Relevant Orders    Ambulatory Referral to Rheumatology   Other Visit Diagnoses     Annual physical exam    -  Primary          Immunizations and preventive care screenings were discussed with patient today. Appropriate education was printed on patient's after visit summary. Counseling:  Alcohol/drug use: discussed moderation in alcohol intake, the recommendations for healthy alcohol use, and avoidance of illicit drug use. Dental Health: discussed importance of regular tooth brushing, flossing, and dental visits. Injury prevention: discussed safety/seat belts, safety helmets, smoke detectors, carbon dioxide detectors, and smoking near bedding or upholstery. Sexual health: discussed sexually transmitted diseases, partner selection, use of condoms, avoidance of unintended pregnancy, and contraceptive alternatives. · Exercise: the importance of regular exercise/physical activity was discussed. Recommend exercise 3-5 times per week for at least 30 minutes. BMI Counseling: Body mass index is 42.66 kg/m². The BMI is above normal. Nutrition recommendations include moderation in carbohydrate intake and reducing intake of saturated and trans fat. Exercise recommendations include moderate physical activity 150 minutes/week. Rationale for BMI follow-up plan is due to patient being overweight or obese. Working on healthy lifestyle change  Following with weight management  Currently on Wegovy    Depression Screening and Follow-up Plan: Denies depression today  No SI/HI        Return in about 1 year (around 8/23/2024) for Annual physical.     Chief Complaint:     Chief Complaint   Patient presents with   • Follow-up      History of Present Illness:     Adult Annual Physical   Patient here for a comprehensive physical exam. The patient reports no problems. Diet and Physical Activity  · Diet/Nutrition: well balanced diet, limited junk food, low fat diet and consuming 3-5 servings of fruits/vegetables daily. · Exercise: walking and moderate cardiovascular exercise. Depression Screening  PHQ-2/9 Depression Screening         General Health  · Sleep: sleeps well. · Hearing: normal - bilateral.  · Vision: no vision problems and goes for regular eye exams. · Dental: regular dental visits. /GYN Health  · Last menstrual period: Exact date unknown  · Contraceptive method: IUD placement. · History of STDs?: no.  · Women's health up-to-date  · Pap/HPV negative 6/2022  · Encourage self breast exams  · Discussed daily vitamin supplementation  · Continue vitamin D     Review of Systems:     Review of Systems   Constitutional: Negative. HENT: Negative. Eyes: Negative. Respiratory: Negative. Cardiovascular: Negative. Gastrointestinal: Negative. Genitourinary: Negative. Musculoskeletal: Negative. Skin: Negative. Neurological: Negative. Psychiatric/Behavioral: Negative. Past Medical History:     History reviewed. No pertinent past medical history.    Past Surgical History:     Past Surgical History:   Procedure Laterality Date   •  SECTION, LOW TRANSVERSE        Social History:     Social History     Socioeconomic History   • Marital status: /Civil Union     Spouse name: None   • Number of children: None   • Years of education: None   • Highest education level: None   Occupational History   • None   Tobacco Use   • Smoking status: Former     Packs/day: 0.25     Years: 4.00     Total pack years: 1.00     Types: Cigarettes     Start date: 2005     Quit date: 2009     Years since quittin.2   • Smokeless tobacco: Never   • Tobacco comments:     on and off throughout college   Vaping Use   • Vaping Use: Never used   Substance and Sexual Activity   • Alcohol use: Not Currently   • Drug use: Never   • Sexual activity: Yes     Partners: Male     Birth control/protection: I.U.D.    Other Topics Concern   • None   Social History Narrative   • None     Social Determinants of Health     Financial Resource Strain: Not on file   Food Insecurity: Not on file   Transportation Needs: Not on file   Physical Activity: Not on file   Stress: Not on file   Social Connections: Not on file   Intimate Partner Violence: Not on file   Housing Stability: Not on file      Family History:     Family History   Problem Relation Age of Onset   • Hypertension Mother    • Hyperlipidemia Mother    • Obesity Mother    • Transient ischemic attack Mother    • Thyroid disease unspecified Mother         hashimotos   • Osteoporosis Mother    • Hypertension Father    • Hyperlipidemia Father    • Heart failure Maternal Grandfather    • Stroke Maternal Grandfather    • Osteoporosis Maternal Grandmother    • Hyperlipidemia Brother       Current Medications:     Current Outpatient Medications   Medication Sig Dispense Refill   • Cholecalciferol (Vitamin D3) 1.25 MG (17668 UT) CAPS PLEASE SEE ATTACHED FOR DETAILED DIRECTIONS     • Cholecalciferol 1.25 MG (08856 UT) TABS Take Vit D 73256 units weekly for 8 weeks and then continue on maintenance dose of Vit D 5000 units daily 8 tablet 0   • Levonorgestrel (MIRENA) 20 MCG/DAY IUD 1 each by Intrauterine route once      • Multiple Vitamin (MULTIVITAMIN ADULT PO) Take by mouth     • Semaglutide-Weight Management (WEGOVY) 0.25 MG/0.5ML Inject 0.5 mL (0.25 mg total) under the skin once a week (Patient not taking: Reported on 8/23/2023) 2 mL 0     No current facility-administered medications for this visit. Allergies: Allergies   Allergen Reactions   • Sulfa Antibiotics Other (See Comments)     Mouth ulcers   Stomach ulcers        Physical Exam:     /80 (BP Location: Left arm, Patient Position: Sitting, Cuff Size: Standard)   Pulse 86   Temp (!) 97.3 °F (36.3 °C) (Tympanic)   Ht 5' 3" (1.6 m)   Wt 109 kg (240 lb 12.8 oz)   LMP  (LMP Unknown) Comment: Mirena IUD  SpO2 98%   BMI 42.66 kg/m²     Physical Exam  Vitals and nursing note reviewed. Constitutional:       General: She is not in acute distress. Appearance: Normal appearance. She is not ill-appearing. HENT:      Head: Normocephalic and atraumatic. Right Ear: Hearing, tympanic membrane, ear canal and external ear normal.      Left Ear: Hearing, tympanic membrane, ear canal and external ear normal.      Nose: Nose normal.      Mouth/Throat:      Lips: Pink. Pharynx: Oropharynx is clear. Eyes:      Extraocular Movements: Extraocular movements intact. Conjunctiva/sclera: Conjunctivae normal.      Pupils: Pupils are equal, round, and reactive to light. Neck:      Thyroid: No thyromegaly. Vascular: No carotid bruit. Cardiovascular:      Rate and Rhythm: Normal rate and regular rhythm. Pulses:           Dorsalis pedis pulses are 2+ on the right side and 2+ on the left side. Posterior tibial pulses are 2+ on the right side and 2+ on the left side. Heart sounds: Normal heart sounds. Pulmonary:      Effort: Pulmonary effort is normal. No respiratory distress. Breath sounds: Normal breath sounds and air entry. Abdominal:      General: Bowel sounds are normal.      Tenderness: There is no abdominal tenderness. There is no right CVA tenderness or left CVA tenderness. Musculoskeletal:      Cervical back: Full passive range of motion without pain. Right lower leg: No edema. Left lower leg: No edema. Lymphadenopathy:      Head:      Right side of head: No submandibular or tonsillar adenopathy. Left side of head: No submandibular or tonsillar adenopathy. Cervical: No cervical adenopathy. Skin:     General: Skin is warm and dry. Neurological:      Mental Status: She is alert and oriented to person, place, and time. Sensory: Sensation is intact. Motor: Motor function is intact. Coordination: Coordination is intact. Deep Tendon Reflexes:      Reflex Scores:       Bicep reflexes are 2+ on the right side and 2+ on the left side. Patellar reflexes are 2+ on the right side and 2+ on the left side.   Psychiatric:         Attention and Perception: Attention and perception normal.         Mood and Affect: Mood and affect normal.         Speech: Speech normal.         Behavior: Behavior normal.          INA Barba   [address]

## 2023-08-29 NOTE — PROGRESS NOTES
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 Seen again by PA this August and at that time was recommended to consume ~1500 calories/day. Wegovy ordered at time of consult. Currently going into week 3 of wegovy. Notices a big change with appetite. Current wt: 236.1 lbs. She has lost 3.9 lbs x ~2 wks. Recent labs show elevated c-reactive protein which may indicate inflammation and make weight loss more difficult. Has been using my fitness pal and finds this helpful. Avg 0049-4552 calories/day. Food recall shows adequate intake of protein. Suggest use of food scale for accuracy. Meal plan and other resources provided. Reviewed calorie needs. Keep up the great work! She will f/u in November.      Dislikes: cottage cheese, nuts, tuna     Patient seen by Medical Provider in past 6 months:  yes  Requested to schedule appointment with Medical Provider: No      Anthropometric Measurements  Start Weight (#): 240 lbs 8/22/23  Current Weight (#): 236.1 lbs   TBW % Change from start weight: 1.6%  Ideal Body Weight (#): 142.9 lbs BMI 25 (115 lbs 63")  Goal Weight (#): no specific number   Highest: 240 lbs   Lowest: 165 lbs     Weight Loss History  Previous weight loss attempts: Commercial Programs (Weight Watchers, [place], etc.)  Exercise  Self Created Diets (Portion Control, Healthy Food Choices, etc.)  adipex  wellbutrin    Food and Nutrition Related History  Wake up: 5-5:30  Bed Time: 10    Food Recall  Breakfast: 8-9:00: egg white omelet w/veggies   Snack: fairlife shake OR chomp stick  Lunch: 12:00: salad w/chickpeas, grilled chicken, fruit, kelly crumbles, sunflower seeds, few croutons, black olives, fat free dressing OR soup (chicken/rice or chicken orzo or beef vegetable)  Snack: 3-3:30: chomp stick OR fairlife OR built bar OR one bar OR power crunch bar    Dinner: 5:30: chicken/apple sausage w/veggies, ~1/4 cup rice  Snack: 8-9:00 popcorn    Beverages: water and coffee w/cream, collage, tea, 2% milk, diet pepsi  Volume of beverage intake: >64 oz water, unsweetened tea, occasional 2% milk    Weekends: Same  Cravings: sweets  Trouble area of day: night     Frequency of Eating out: 1-2x/wk  Food restrictions: n/a  Cooking: self   Food Shopping: self    Physical Activity Intake  Activity: nothing consistent yet   Frequency:infrequently  Physical limitations/barriers to exercise: n/a    Estimated Needs  Energy  SECA: BMR: n/a      X 1.3 -1000 =  [employer] Energy Needs: BMR : 5646   1-2# loss weekly sedentary:  8678-9111             1-2# loss weekly lightly active: 4443-7419  Maintenance calories for sedentary activity level: 2076  Protein: 63-78 gm    (1.2-1.5g/kg IBW)  Fluid: 61 oz     (35mL/kg IBW)    Nutrition Diagnosis  Yes;     Overweight/obesity  related to Excess energy intake as evidenced by  BMI more than normative standard for age and sex (obesity-grade III 36+)       Nutrition Intervention    Nutrition Prescription  Calories: 8114-1858  Protein:  gm    Meal Plan (Parviz/Pro)  Breakfast: 200-300, 15-20  Snack: 0-150, 0-10  Lunch: 300, 20  Snack: 150, 5-10  Dinner: 350-400, 30  Snack: 100-150, 5-10    Nutrition Education:    Calorie controlled menu  Lean protein food choices  Healthy snack options  Food journaling tips      Nutrition Counseling:  Strategies: meal planning, portion sizes, healthy snack choices, hydration, fiber intake, protein intake, exercise, food journal      Monitoring and Evaluation:  Evaluation criteria:  Energy Intake  Meet protein needs  Maintain adequate hydration  Monitor weekly weight  Meal planning/preparation  Food journal   Decreased portions at mealtimes and snacks  Physical activity     Barriers to learning:none  Readiness to change: Action:  (Changing behavior)  Comprehension: very good  Expected Compliance: very good

## 2023-09-05 ENCOUNTER — TELEPHONE (OUTPATIENT)
Age: 38
End: 2023-09-05

## 2023-09-05 NOTE — TELEPHONE ENCOUNTER
----- Message from Isa Grider MD sent at 9/5/2023 11:10 AM EDT -----  Labs reviewed. Cortisol came back appropriately suppressed on the dexamethasone suppression test which rules out high cortisol, also testosterone levels being low normal not suggestive of PCOS.   Your iron and ferritin levels are low (goal ferritin should be above 75), which may be contributing to the fatigue, please discuss with your PCP to start on iron supplementation

## 2023-09-06 ENCOUNTER — OFFICE VISIT (OUTPATIENT)
Dept: BARIATRICS | Facility: CLINIC | Age: 38
End: 2023-09-06
Payer: COMMERCIAL

## 2023-09-06 VITALS — WEIGHT: 236.1 LBS | BODY MASS INDEX: 41.83 KG/M2 | HEIGHT: 63 IN

## 2023-09-06 DIAGNOSIS — E66.01 CLASS 3 SEVERE OBESITY DUE TO EXCESS CALORIES WITH SERIOUS COMORBIDITY AND BODY MASS INDEX (BMI) OF 40.0 TO 44.9 IN ADULT (HCC): ICD-10-CM

## 2023-09-06 PROCEDURE — S9470 NUTRITIONAL COUNSELING, DIET: HCPCS

## 2023-09-06 PROCEDURE — RECHECK

## 2023-09-19 DIAGNOSIS — Z51.81 MEDICATION MONITORING ENCOUNTER: ICD-10-CM

## 2023-09-19 DIAGNOSIS — E66.01 OBESITY, CLASS III, BMI 40-49.9 (MORBID OBESITY) (HCC): Primary | ICD-10-CM

## 2023-10-05 ENCOUNTER — OFFICE VISIT (OUTPATIENT)
Dept: LAB | Facility: HOSPITAL | Age: 38
End: 2023-10-05
Payer: COMMERCIAL

## 2023-10-05 DIAGNOSIS — E66.01 OBESITY, CLASS III, BMI 40-49.9 (MORBID OBESITY) (HCC): ICD-10-CM

## 2023-10-05 DIAGNOSIS — Z51.81 MEDICATION MONITORING ENCOUNTER: ICD-10-CM

## 2023-10-05 PROCEDURE — 93005 ELECTROCARDIOGRAM TRACING: CPT

## 2023-10-06 LAB
ATRIAL RATE: 75 BPM
P AXIS: 24 DEGREES
PR INTERVAL: 154 MS
QRS AXIS: 11 DEGREES
QRSD INTERVAL: 84 MS
QT INTERVAL: 378 MS
QTC INTERVAL: 422 MS
T WAVE AXIS: 21 DEGREES
VENTRICULAR RATE: 75 BPM

## 2023-10-06 PROCEDURE — 93010 ELECTROCARDIOGRAM REPORT: CPT | Performed by: INTERNAL MEDICINE

## 2023-10-17 ENCOUNTER — CLINICAL SUPPORT (OUTPATIENT)
Dept: BARIATRICS | Facility: CLINIC | Age: 38
End: 2023-10-17

## 2023-10-17 VITALS
HEART RATE: 100 BPM | SYSTOLIC BLOOD PRESSURE: 109 MMHG | WEIGHT: 228.8 LBS | DIASTOLIC BLOOD PRESSURE: 87 MMHG | BODY MASS INDEX: 40.54 KG/M2 | HEIGHT: 63 IN | RESPIRATION RATE: 16 BRPM

## 2023-10-17 DIAGNOSIS — R63.5 ABNORMAL WEIGHT GAIN: Primary | ICD-10-CM

## 2023-10-17 PROCEDURE — RECHECK

## 2023-10-17 NOTE — PROGRESS NOTES
Patient last visit weight:  Patient current visit weight:    If you are taking phentermine or other oral weight loss medications, are you experiencing any of the following symptoms:  Headache:   Blurred Vision:   Chest Pain:   Palpitations: Insomnia:   SPECIFY ORAL MEDICATION AND DOSAGE:     If you are taking an injectable medication,  are you experiencing any of the following symptoms:  Bloating:   Nausea:  Vomiting:   Constipation:   Diarrhea:  SPECIFY INJECTABLE MEDICATION AND CURRENT DOSAGE: Wegovy  She hasn't been on the [facility] for a month can't find the med's , she wants to switch to [place] and try looking for that, she doesn't want to take pill form yet    Vitals:    Is BP less than 100/60? No  Is BP greater than 140/90? No  Is HR greater than 100? NO  **If yes to any of the above, have patient relax and repeat in 5-10 minutes**    Repeat values:    Is BP less than 100/60? Is BP greater than 140/90? Is HR greater than 100?   **If values remain outside of ranges above, please consult provider for next steps**

## 2023-10-18 ENCOUNTER — OFFICE VISIT (OUTPATIENT)
Dept: RHEUMATOLOGY | Facility: CLINIC | Age: 38
End: 2023-10-18
Payer: COMMERCIAL

## 2023-10-18 VITALS
WEIGHT: 229 LBS | BODY MASS INDEX: 40.57 KG/M2 | HEIGHT: 63 IN | SYSTOLIC BLOOD PRESSURE: 122 MMHG | DIASTOLIC BLOOD PRESSURE: 70 MMHG

## 2023-10-18 DIAGNOSIS — R53.83 OTHER FATIGUE: ICD-10-CM

## 2023-10-18 DIAGNOSIS — M25.50 ARTHRALGIA, UNSPECIFIED JOINT: Primary | ICD-10-CM

## 2023-10-18 PROCEDURE — 99244 OFF/OP CNSLTJ NEW/EST MOD 40: CPT | Performed by: INTERNAL MEDICINE

## 2023-10-18 NOTE — PROGRESS NOTES
This is a Rheumatology Consult seen at the request of Rickie BUCKLEY      HPI: Barrington aBck is a 40 y/o female who presents for further evaluation + TONG. She has past medical history HLD    She moved 2 years ago from [place]. After moving to [place] she was working 2 jobs/shifts. Initially attributed her fatigue to stress of moving and also working 2 shifts. Felt could be "hormonal"    She currently works one shift at [employer] to have fatigue but mildly improved. Tries to cut down on sugar and caffeine. + morning stiffness less than 30 minutes. She feels hands are stiff and swollen in the morning.     Denies rashes, Raynaud's, seizures or strokes, PE/DVT, renal failure, pleural-pericardial effusion.             --------------------------------------------------------------------------------------------------------        ROS:        All other ROS was reviewed and negative except as above         --------------------------------------------------------------------------------------------------------    Past Medical History    HLD        Past Surgical History    Past Surgical History:   Procedure Laterality Date     SECTION, LOW TRANSVERSE         Tonsillectomy        Family History    Family History   Problem Relation Age of Onset    Hypertension Mother     Hyperlipidemia Mother     Obesity Mother     Transient ischemic attack Mother     Thyroid disease unspecified Mother         hashimotos    Osteoporosis Mother     Hypertension Father     Hyperlipidemia Father     Heart failure Maternal Grandfather     Stroke Maternal Grandfather     Osteoporosis Maternal Grandmother     Hyperlipidemia Brother       Mother has SLE      Social History    Social History     Tobacco Use    Smoking status: Former     Packs/day: 0.25     Years: 4.00     Total pack years: 1.00     Types: Cigarettes     Start date: 2005     Quit date: 2009     Years since quittin.3    Smokeless tobacco: Never    Tobacco comments:     on and off throughout college   Vaping Use    Vaping Use: Never used   Substance Use Topics    Alcohol use: Not Currently    Drug use: Never     Manager at [employer]        Allergies    Allergies   Allergen Reactions    Sulfa Antibiotics Other (See Comments)     Mouth ulcers   Stomach ulcers           Medications    Current Outpatient Medications   Medication Instructions    Cholecalciferol (Vitamin D3) 1.25 MG (24975 UT) CAPS PLEASE SEE ATTACHED FOR DETAILED DIRECTIONS    Cholecalciferol 1.25 MG (16879 UT) TABS Take Vit D 76127 units weekly for 8 weeks and then continue on maintenance dose of Vit D 5000 units daily    Levonorgestrel (MIRENA) 20 MCG/DAY IUD 1 each, Intrauterine, Once    Multiple Vitamin (MULTIVITAMIN ADULT PO) Oral    Semaglutide-Weight Management (WEGOVY) 0.25 mg, Subcutaneous, Weekly          Physical Exam    /70   Ht 5' 3" (1.6 m)   Wt 104 kg (229 lb)   BMI 40.57 kg/m²     GEN: AAO, No apparent distress. Patient is well developed. HEENT:  Pupils are equal, round and reactive. Sclera are clear. Fundoscopic exam is normal.  External ears are without lesions. Oral pharynx is clear of ulcers or other lesions. MMM. NECK:  Supple. There is no adenopathy appreciable in anterior or posterior cervical chains or supraclavicularly. JVP is normal.    HEART: Regular rate and rhythm. There is no appreciable murmur, gallop or rub. LUNGS: Clear to auscultation. ABD:  Soft, without tenderness, rebound or guarding. No appreciable organomegally. NEURO: Speech and cognition are normal.  Strength is 5/5 throughout. Tone is normal.  DTRs are 2/4 at the knees, ankles and elbows.   Gait is normal.  SKIN: There are no rashes or lesions    MUSCULOSKELETAL:   -Hands: normal  -Wrists: normal  -Elbows: normal  -Shoulders: normal  -Neck: normal  -Back: normal  -Hips: normal  -Knees: normal  -Ankles: normal  -Feet: normal      ________________________________________________________________________          Results Review    Component      Latest Ref Rng 8/3/2023   WBC      4.31 - 10.16 Thousand/uL 7.78    Red Blood Cell Count      3.81 - 5.12 Million/uL 4.83    Hemoglobin      11.5 - 15.4 g/dL 12.2    HCT      34.8 - 46.1 % 39.6    MCV      82 - 98 fL 82    MCH      26.8 - 34.3 pg 25.3 (L)    MCHC      31.4 - 37.4 g/dL 30.8 (L)    RDW      11.6 - 15.1 % 13.2    MPV      8.9 - 12.7 fL 9.6    Platelet Count      818 - 390 Thousands/uL 298    nRBC      /100 WBCs 0    Neutrophils %      43 - 75 % 61    Immat GRANS %      0 - 2 % 0    Lymphocytes Relative      14 - 44 % 32    Monocytes Relative      4 - 12 % 6    Eosinophils      0 - 6 % 1    Basophils Relative      0 - 1 % 0    Absolute Neutrophils      1.85 - 7.62 Thousands/µL 4.69    Immature Grans Absolute      0.00 - 0.20 Thousand/uL 0.01    Lymphocytes Absolute      0.60 - 4.47 Thousands/µL 2.50    Absolute Monocytes      0.17 - 1.22 Thousand/µL 0.48    Absolute Eosinophils      0.00 - 0.61 Thousand/µL 0.07    Basophils Absolute      0.00 - 0.10 Thousands/µL 0.03    Sodium      135 - 147 mmol/L 140    Potassium      3.5 - 5.3 mmol/L 4.2    Chloride      96 - 108 mmol/L 110 (H)    CO2      21 - 32 mmol/L 25    Anion Gap      mmol/L 5    BUN      5 - 25 mg/dL 11    Creatinine      0.60 - 1.30 mg/dL 0.79    GLUCOSE FASTING      65 - 99 mg/dL 106 (H)    Calcium      8.3 - 10.1 mg/dL 9.5    AST      5 - 45 U/L 19    ALT      12 - 78 U/L 35    Alkaline Phosphatase      46 - 116 U/L 80    Total Protein      6.4 - 8.4 g/dL 7.4    Albumin      3.5 - 5.0 g/dL 3.6    TOTAL BILIRUBIN      0.20 - 1.00 mg/dL 0.35    eGFR      ml/min/1.73sq m 96    TONG Titer 1 Titer of 40    TONG Pattern 1 Un-typeable pattern    TONG      Negative  Positive ! C-REACTIVE PROTEIN      <3.0 mg/L 4.5 (H)    RHEUMATOID FACTOR      Negative  Negative       Legend:  (L) Low  (H) High  ! Abnormal          Impressions and Plans:    Terrell Ayon is a 40 y/o low titer TONG 1:40 which is usually not considered significant at that level.     + fatigue and mild am stiffness    On exam no swollen or tender joints, rashes , Raynaud's. No other signs and symptoms suggestive inflammatory arthritis/CTD such as RA/lupus etc    Reviewed labs TONG and CRP are not elevated at significant level    Check DNA, complements, CCP, hand films    At this time fatigue less likely related to rheumatic process    She is scheduled for sleep study    RTC annually or sooner if needed    Thank you for involving me in this patient's care.         Oneida Stewart MD  Department of Rheumatology  [address]

## 2023-10-23 ENCOUNTER — OFFICE VISIT (OUTPATIENT)
Dept: OBGYN CLINIC | Facility: CLINIC | Age: 38
End: 2023-10-23
Payer: COMMERCIAL

## 2023-10-23 VITALS
DIASTOLIC BLOOD PRESSURE: 74 MMHG | BODY MASS INDEX: 40.57 KG/M2 | SYSTOLIC BLOOD PRESSURE: 118 MMHG | WEIGHT: 229 LBS | HEIGHT: 63 IN

## 2023-10-23 DIAGNOSIS — R61 HYPERHIDROSIS: ICD-10-CM

## 2023-10-23 DIAGNOSIS — Z01.419 ENCOUNTER FOR WELL WOMAN EXAM WITH ROUTINE GYNECOLOGICAL EXAM: Primary | ICD-10-CM

## 2023-10-23 DIAGNOSIS — F41.9 ANXIETY: ICD-10-CM

## 2023-10-23 PROCEDURE — S0612 ANNUAL GYNECOLOGICAL EXAMINA: HCPCS | Performed by: OBSTETRICS & GYNECOLOGY

## 2023-10-23 RX ORDER — BUPROPION HYDROCHLORIDE 75 MG/1
75 TABLET ORAL DAILY
Qty: 7 TABLET | Refills: 0 | Status: SHIPPED | OUTPATIENT
Start: 2023-10-23

## 2023-10-23 RX ORDER — BUPROPION HYDROCHLORIDE 150 MG/1
150 TABLET ORAL DAILY
Qty: 90 TABLET | Refills: 3 | Status: SHIPPED | OUTPATIENT
Start: 2023-10-23

## 2023-10-23 NOTE — PROGRESS NOTES
[facility] [address] [place] [address] [address], [address], [place], [place]    ASSESSMENT/PLAN: Noe Nichols is a 41 y.o. [id number] who presents for annual gynecologic exam.    Encounter for routine gynecologic examination  - Routine well woman exam completed today. - Cervical Cancer Screening: Current ASCCP Guidelines reviewed. Last Pap: 06/15/2022. Next Pap Due: 2024 or 2025 (discussed)  - Contraceptive counseling discussed. Current contraception: Mirena since 2022    Additional problems addressed during this visit:  1. Encounter for well woman exam with routine gynecological exam    2. Anxiety  -     buPROPion (WELLBUTRIN) 75 mg tablet; Take 1 tablet (75 mg total) by mouth in the morning  -     buPROPion (Wellbutrin XL) 150 mg 24 hr tablet; Take 1 tablet (150 mg total) by mouth daily        CC:  Annual Gynecologic Examination    HPI: Noe Nichols is a 41 y.o. [id number] who presents for annual gynecologic examination. Gynecologic Exam    Patient presents for routine annual, had Mirena inserted 2022  Still reports anxiety, would like to restart Wellbutrin, script sent. Patient also reports increased sweating, palpitations, and tremors    The following portions of the patient's history were reviewed and updated as appropriate: allergies, current medications, past family history, past medical history, obstetric history, gynecologic history, past social history, past surgical history and problem list.    Review of Systems   Constitutional: Negative. HENT: Negative. Eyes: Negative. Respiratory: Negative. Cardiovascular: Negative. Gastrointestinal: Negative. Genitourinary: Negative. Musculoskeletal: Negative. All other systems reviewed and are negative. Objective:  /74   Ht 5' 3" (1.6 m)   Wt 104 kg (229 lb)   BMI 40.57 kg/m²    Physical Exam  Vitals reviewed. Constitutional:       General: She is not in acute distress. Appearance: She is well-developed. HENT:      Head: Normocephalic and atraumatic. Nose: Nose normal.   Cardiovascular:      Rate and Rhythm: Normal rate. Pulmonary:      Effort: Pulmonary effort is normal. No respiratory distress. Chest:   Breasts:     Breasts are symmetrical.      Right: Normal. No mass, nipple discharge, skin change or tenderness. Left: Normal. No mass, nipple discharge, skin change or tenderness. Abdominal:      General: There is no distension. Palpations: Abdomen is soft. There is no mass. Tenderness: There is no abdominal tenderness. There is no guarding or rebound. Genitourinary:     General: Normal vulva. Exam position: Lithotomy position. Labia:         Right: No lesion. Left: No lesion. Urethra: No prolapse (urethral meatus normal). Vagina: Normal. No vaginal discharge, erythema or bleeding. Cervix: Normal.      Uterus: Normal.       Adnexa: Right adnexa normal and left adnexa normal.   Musculoskeletal:         General: Normal range of motion. Cervical back: Normal range of motion. Lymphadenopathy:      Upper Body:      Right upper body: No supraclavicular, axillary or pectoral adenopathy. Left upper body: No supraclavicular, axillary or pectoral adenopathy. Lower Body: No right inguinal adenopathy. No left inguinal adenopathy. Skin:     General: Skin is warm and dry. Neurological:      Mental Status: She is alert and oriented to person, place, and time. Psychiatric:         Behavior: Behavior normal.         Thought Content:  Thought content normal.         Judgment: Judgment normal.

## 2023-10-24 ENCOUNTER — LAB (OUTPATIENT)
Dept: LAB | Facility: HOSPITAL | Age: 38
End: 2023-10-24
Payer: COMMERCIAL

## 2023-10-24 DIAGNOSIS — R61 HYPERHIDROSIS: ICD-10-CM

## 2023-10-24 DIAGNOSIS — R53.83 OTHER FATIGUE: ICD-10-CM

## 2023-10-24 DIAGNOSIS — M25.50 ARTHRALGIA, UNSPECIFIED JOINT: ICD-10-CM

## 2023-10-24 LAB
C3 SERPL-MCNC: 183 MG/DL (ref 87–200)
C4 SERPL-MCNC: 47 MG/DL (ref 19–52)

## 2023-10-24 PROCEDURE — 84305 ASSAY OF SOMATOMEDIN: CPT

## 2023-10-24 PROCEDURE — 86200 CCP ANTIBODY: CPT

## 2023-10-24 PROCEDURE — 86160 COMPLEMENT ANTIGEN: CPT

## 2023-10-24 PROCEDURE — 36415 COLL VENOUS BLD VENIPUNCTURE: CPT

## 2023-10-24 PROCEDURE — 82384 ASSAY THREE CATECHOLAMINES: CPT

## 2023-10-24 PROCEDURE — 86225 DNA ANTIBODY NATIVE: CPT

## 2023-10-24 PROCEDURE — 83835 ASSAY OF METANEPHRINES: CPT

## 2023-10-25 LAB — DSDNA AB SER-ACNC: 10 IU/ML (ref 0–9)

## 2023-10-26 LAB — IGF-I SERPL-MCNC: 164 NG/ML (ref 79–259)

## 2023-10-27 LAB — CCP AB SER IA-ACNC: 1.2

## 2023-10-30 LAB
DOPAMINE 24H UR-MRATE: <30 PG/ML (ref 0–48)
EPINEPH PLAS-MCNC: <15 PG/ML (ref 0–62)
METANEPH FREE SERPL-MCNC: 12 PG/ML (ref 0–88)
NOREPINEPH PLAS-MCNC: 348 PG/ML (ref 0–874)
NORMETANEPHRINE SERPL-MCNC: 54.4 PG/ML (ref 0–210.1)

## 2023-11-03 NOTE — PROGRESS NOTES
Weight Management Medical Nutrition Assessment  Val Corrales is here for 2/12 employee meal planning. Current wt:  230.1 lbs. She has lost 6 lbs x ~2 months with overall loss of 9.9 lbs x 2 1/2 months. Concerned she is not getting enough protein. Has been food logging and avg 7863-1832 calories, ~55-77 gm protein. Other protein rich snacks discussed. Recommend add carb to breakfast. Does have a stationary bike and ordered a new seat for this. Has been walking more than prior. Questions answered re: green supplement, protein intake. She will f/u in January.     Dislikes: tuna     Patient seen by Medical Provider in past 6 months:  yes  Requested to schedule appointment with Medical Provider: No      Anthropometric Measurements  Start Weight (#): 240 lbs 8/22/23  Current Weight (#): 230.1 lbs   TBW % Change from start weight: 4.1%  Ideal Body Weight (#): 142.9 lbs BMI 25 (115 lbs 63")  Goal Weight (#): no specific number   Highest: 240 lbs   Lowest: 165 lbs     Weight Loss History  Previous weight loss attempts: Commercial Programs (Weight Watchers, Anali Osvaldo, etc.)  Exercise  Self Created Diets (Portion Control, Healthy Food Choices, etc.)  adipex  wellbutrin    Food and Nutrition Related History  Wake up: 5-5:30  Bed Time: 10    Food Recall  Breakfast: 8-9:00: eggs, 2 turkey kelly OR egg white omelet w/veggies OR 1 egg, turkey kelly, shailesh's killer bread, avocado  Snack: skip  OR string cheese   Lunch: 12:00: salad w/chickpeas, grilled chicken, fruit, kelly crumbles, sunflower seeds, few croutons, black olives, fat free dressing OR soup (chicken/rice or chicken orzo or beef vegetable) OR salmon, sm carb, veggies  Snack: 3-3:30: chomp stick OR fairlife OR built bar OR one bar OR power crunch bar    Dinner: 5:30: chicken/turkey w/veggies, ~1/2 cup carb  Snack: 8-9:00 popcorn    Beverages: water and coffee w/cream, collage, tea, 2% milk, diet pepsi  Volume of beverage intake: >64 oz water, unsweetened tea, occasional 2% milk    Weekends: Same  Cravings: sweets  Trouble area of day: night     Frequency of Eating out: 1-2x/wk  Food restrictions: n/a  Cooking: self   Food Shopping: self    Physical Activity Intake  Activity: walking   Frequency:several times per week  Physical limitations/barriers to exercise: n/a    Estimated Needs  Energy  SECA: BMR: n/a      X 1.3 -1000 =  [place] Energy Needs: BMR : 1315   1-2# loss weekly sedentary:  8582-8318             1-2# loss weekly lightly active: 5989-9304  Maintenance calories for sedentary activity level: 2043  Protein: 63-78 gm    (1.2-1.5g/kg IBW)  Fluid: 61 oz     (35mL/kg IBW)    Nutrition Diagnosis  Yes;     Overweight/obesity  related to Excess energy intake as evidenced by  BMI more than normative standard for age and sex (obesity-grade III 36+)       Nutrition Intervention    Nutrition Prescription  Calories: 4813-7741  Protein:  gm    Meal Plan (Parviz/Pro)  Breakfast: 200-300, 15-20  Snack: 0-150, 0-10  Lunch: 300, 20  Snack: 150, 5-10  Dinner: 350-400, 30  Snack: 100-150, 5-10    Nutrition Education:    Calorie controlled menu  Lean protein food choices  Healthy snack options  Food journaling tips      Nutrition Counseling:  Strategies: meal planning, portion sizes, healthy snack choices, hydration, fiber intake, protein intake, exercise, food journal      Monitoring and Evaluation:  Evaluation criteria:  Energy Intake  Meet protein needs  Maintain adequate hydration  Monitor weekly weight  Meal planning/preparation  Food journal   Decreased portions at mealtimes and snacks  Physical activity     Barriers to learning:none  Readiness to change: Action:  (Changing behavior)  Comprehension: very good  Expected Compliance: very good

## 2023-11-06 ENCOUNTER — OFFICE VISIT (OUTPATIENT)
Dept: BARIATRICS | Facility: CLINIC | Age: 38
End: 2023-11-06
Payer: COMMERCIAL

## 2023-11-06 VITALS — HEIGHT: 63 IN | WEIGHT: 230.1 LBS | BODY MASS INDEX: 40.77 KG/M2

## 2023-11-06 DIAGNOSIS — E66.01 MORBID OBESITY WITH BODY MASS INDEX (BMI) OF 40.0 TO 44.9 IN ADULT (HCC): Primary | ICD-10-CM

## 2023-11-06 PROCEDURE — S9470 NUTRITIONAL COUNSELING, DIET: HCPCS

## 2023-11-06 PROCEDURE — RECHECK

## 2023-12-07 DIAGNOSIS — Z00.6 ENCOUNTER FOR EXAMINATION FOR NORMAL COMPARISON OR CONTROL IN CLINICAL RESEARCH PROGRAM: ICD-10-CM

## 2024-01-18 ENCOUNTER — OFFICE VISIT (OUTPATIENT)
Dept: BARIATRICS | Facility: CLINIC | Age: 39
End: 2024-01-18
Payer: COMMERCIAL

## 2024-01-18 VITALS
RESPIRATION RATE: 16 BRPM | HEART RATE: 99 BPM | DIASTOLIC BLOOD PRESSURE: 88 MMHG | BODY MASS INDEX: 39.69 KG/M2 | HEIGHT: 63 IN | SYSTOLIC BLOOD PRESSURE: 128 MMHG | WEIGHT: 224 LBS

## 2024-01-18 DIAGNOSIS — E66.9 CLASS II OBESITY: Primary | ICD-10-CM

## 2024-01-18 DIAGNOSIS — F41.9 ANXIETY: ICD-10-CM

## 2024-01-18 PROBLEM — E66.812 CLASS II OBESITY: Status: ACTIVE | Noted: 2022-10-18

## 2024-01-18 PROCEDURE — 99214 OFFICE O/P EST MOD 30 MIN: CPT | Performed by: PHYSICIAN ASSISTANT

## 2024-01-18 RX ORDER — NALTREXONE HYDROCHLORIDE 50 MG/1
TABLET, FILM COATED ORAL
Qty: 30 TABLET | Refills: 2 | Status: SHIPPED | OUTPATIENT
Start: 2024-01-18

## 2024-01-18 NOTE — PROGRESS NOTES
Assessment/Plan:    Class II obesity  -Patient is pursuing Conservative Program with RD meal planning  -Initial weight loss goal of 5-10% weight loss for improved health  -Screening labs  up to date    Initial:240lb  Last Visit:230  Current:224  Change:-16 lb    Goals:  -Food log (ie.) [email or web address] caloreis  - To continue to drink at least 64oz of water daily.No sugary beverages.  -continue to incorporate exercise    She was started on wegovy 0.25mg and did well with the first month. However due to supply has not been able to continue it. She would like to restart it in the future.  She does take wellbutrin and recommended adding in naltrexone to off label mimic contrave.  Discussed possible side effects.     Contraception: IUD  Medication contract signed prior        Anxiety  Doing well on wellbutrin        Follow up in approximately  4 months  with Non-Surgical Physician/Advanced Practitioner. She does have RD visit next week also.       Diagnoses and all orders for this visit:    Class II obesity  -     naltrexone (REVIA) 50 mg tablet; Take 1/2 a tablet a day for 2 weeks and then take full tablet a day    Anxiety          Subjective:   Chief Complaint   Patient presents with    Follow-up     MWM 5m f/u; Waist-38in        Patient ID: Summer Colbert  is a 38 y.o. female with excess weight/obesity here to pursue weight managment.  Patient is pursuing Conservative Program.     HPI Here for followup.  She was started on wegovy but couldn't continue due to supply. She is making dietary changes.  She has cut out diet soda.  She is trying to increase protein and has cut out beef and decreased pork.    Wt Readings from Last 10 Encounters:   01/18/24 102 kg (224 lb)   11/06/23 104 kg (230 lb 1.6 oz)   10/23/23 104 kg (229 lb)   10/18/23 104 kg (229 lb)   10/17/23 104 kg (228 lb 12.8 oz)   09/06/23 107 kg (236 lb 1.6 oz)   08/23/23 109 kg (240 lb 12.8 oz)   08/22/23 109 kg (240 lb)    23 107 kg (235 lb)   23 108 kg (237 lb)       Food loggin calories, usually about 55 gm protein  Increased appetite/cravings:yes  Exercise:fitting it in when she can   Hydration:water only now      The following portions of the patient's history were reviewed and updated as appropriate: She  has no past medical history on file.  She   Patient Active Problem List    Diagnosis Date Noted    Positive TONG (antinuclear antibody) 2023    Hirsutism 2023    Vitamin D deficiency 2023    Sleep apnea 2023    Arthralgia 2023    Hyperlipidemia 10/18/2022    Other fatigue 10/18/2022    Class II obesity 10/18/2022    Anxiety 10/18/2022    Encounter for surveillance of abnormal nevi 10/18/2022    IUD (intrauterine device) in place 2022     She  has a past surgical history that includes  section, low transverse.  Her family history includes Heart failure in her maternal grandfather; Hyperlipidemia in her brother, father, and mother; Hypertension in her father and mother; Obesity in her mother; Osteoporosis in her maternal grandmother and mother; Stroke in her maternal grandfather; Thyroid disease unspecified in her mother; Transient ischemic attack in her mother.  She  reports that she quit smoking about 14 years ago. Her smoking use included cigarettes. She started smoking about 18 years ago. She has a 1.0 pack-year smoking history. She has never used smokeless tobacco. She reports that she does not currently use alcohol. She reports that she does not use drugs.  Current Outpatient Medications   Medication Sig Dispense Refill    buPROPion (Wellbutrin XL) 150 mg 24 hr tablet Take 1 tablet (150 mg total) by mouth daily 90 tablet 3    Cholecalciferol 1.25 MG (23322 UT) TABS Take Vit D 24217 units weekly for 8 weeks and then continue on maintenance dose of Vit D 5000 units daily 8 tablet 0    Levonorgestrel (MIRENA) 20 MCG/DAY IUD 1 each by Intrauterine route once        "Multiple Vitamin (MULTIVITAMIN ADULT PO) Take by mouth      naltrexone (REVIA) 50 mg tablet Take 1/2 a tablet a day for 2 weeks and then take full tablet a day 30 tablet 2     No current facility-administered medications for this visit.     Current Outpatient Medications on File Prior to Visit   Medication Sig    buPROPion (Wellbutrin XL) 150 mg 24 hr tablet Take 1 tablet (150 mg total) by mouth daily    Cholecalciferol 1.25 MG (54608 UT) TABS Take Vit D 22369 units weekly for 8 weeks and then continue on maintenance dose of Vit D 5000 units daily    Levonorgestrel (MIRENA) 20 MCG/DAY IUD 1 each by Intrauterine route once     Multiple Vitamin (MULTIVITAMIN ADULT PO) Take by mouth    [DISCONTINUED] buPROPion (WELLBUTRIN) 75 mg tablet Take 1 tablet (75 mg total) by mouth in the morning (Patient not taking: Reported on 1/18/2024)     No current facility-administered medications on file prior to visit.     She is allergic to sulfa antibiotics..    Review of Systems   Constitutional:  Negative for fever.   Respiratory:  Negative for shortness of breath.    Cardiovascular:  Negative for chest pain and palpitations.   Gastrointestinal:  Negative for abdominal pain, constipation, diarrhea and vomiting.   Genitourinary:  Negative for difficulty urinating.   Musculoskeletal:  Negative for arthralgias and back pain.   Skin:  Negative for rash.   Neurological:  Negative for headaches.   Psychiatric/Behavioral:  Negative for dysphoric mood. The patient is not nervous/anxious.        Objective:    /88   Pulse 99   Resp 16   Ht 5' 3\" (1.6 m)   Wt 102 kg (224 lb)   BMI 39.68 kg/m²      Physical Exam  Vitals and nursing note reviewed.   Constitutional:       General: She is not in acute distress.     Appearance: She is well-developed. She is obese.   HENT:      Head: Normocephalic and atraumatic.   Eyes:      Conjunctiva/sclera: Conjunctivae normal.   Neck:      Thyroid: No thyromegaly.   Pulmonary:      Effort: Pulmonary " effort is normal. No respiratory distress.   Skin:     Findings: No rash (visible).   Neurological:      Mental Status: She is alert and oriented to person, place, and time.   Psychiatric:         Mood and Affect: Mood normal.         Behavior: Behavior normal.

## 2024-01-18 NOTE — ASSESSMENT & PLAN NOTE
-Patient is pursuing Conservative Program with RD meal planning  -Initial weight loss goal of 5-10% weight loss for improved health  -Screening labs  up to date    Initial:240lb  Last Visit:230  Current:224  Change:-16 lb    Goals:  -Food log (ie.) [email or web address] caloreis  - To continue to drink at least 64oz of water daily.No sugary beverages.  -continue to incorporate exercise    She was started on wegovy 0.25mg and did well with the first month. However due to supply has not been able to continue it. She would like to restart it in the future.  She does take wellbutrin and recommended adding in naltrexone to off label mimic contrave.  Discussed possible side effects.     Contraception: IUD  Medication contract signed prior

## 2024-01-19 ENCOUNTER — TELEPHONE (OUTPATIENT)
Dept: PSYCHIATRY | Facility: CLINIC | Age: 39
End: 2024-01-19

## 2024-01-19 NOTE — TELEPHONE ENCOUNTER
Patient has been added to the Talk Therapy wait list without a referral.    Insurance: [facility]  Insurance Type:    Commercial [x]   Medicaid []   [facility] (if applicable)   Medicare []  Location Preference: Therapy Anywhere  Provider Preference: none  Virtual: Yes [x] No []  Were outside resources sent: Yes [] No [x]

## 2024-02-01 ENCOUNTER — OFFICE VISIT (OUTPATIENT)
Dept: URGENT CARE | Facility: CLINIC | Age: 39
End: 2024-02-01
Payer: COMMERCIAL

## 2024-02-01 VITALS
RESPIRATION RATE: 18 BRPM | HEART RATE: 84 BPM | DIASTOLIC BLOOD PRESSURE: 71 MMHG | OXYGEN SATURATION: 98 % | SYSTOLIC BLOOD PRESSURE: 137 MMHG | TEMPERATURE: 96.8 F

## 2024-02-01 DIAGNOSIS — H65.91 RIGHT NON-SUPPURATIVE OTITIS MEDIA: Primary | ICD-10-CM

## 2024-02-01 PROCEDURE — 99213 OFFICE O/P EST LOW 20 MIN: CPT

## 2024-02-01 RX ORDER — AMOXICILLIN 500 MG/1
1000 CAPSULE ORAL EVERY 8 HOURS SCHEDULED
Qty: 42 CAPSULE | Refills: 0 | Status: SHIPPED | OUTPATIENT
Start: 2024-02-01 | End: 2024-02-08

## 2024-02-01 NOTE — PROGRESS NOTES
[facility]        NAME: Summer Colbert is a 38 y.o. female  : 1985    MRN: [id number]  DATE: 2024  TIME: 8:19 AM    Assessment and Plan   Right non-suppurative otitis media [H65.91]  1. Right non-suppurative otitis media  amoxicillin (AMOXIL) 500 mg capsule      Supportive care recommended as well.   Patient Instructions     Otitis media diagnosed on exam today.  Antibiotics sent to the pharmacy. Follow up with PCP in 3-5 days if no improvement. Proceed to ER if symptoms worsen.    Chief Complaint     Chief Complaint   Patient presents with    Earache     Right ear pain for a couple of days and this am is worse. Taking Tylenol and decongestant     History of Present Illness     Summer Colbert is a 38 y.o. female presenting to the office today complaining of ear pain.   Symptoms have been present for 1 days, and include ear pain, sinus pressure, congestion. She has been recovering from a URI, and now she has severe pain in her right ear.   She has tried decongestants for her symptoms, no relief.  Sick contacts include: none    Review of Systems     Review of Systems   Constitutional:  Negative for chills and fever.   HENT:  Positive for congestion and ear pain. Negative for sore throat and trouble swallowing.    Respiratory:  Positive for cough. Negative for shortness of breath, wheezing and stridor.    Gastrointestinal:  Negative for abdominal pain, nausea and vomiting.   Genitourinary: Negative.  Negative for difficulty urinating.   Musculoskeletal:  Negative for myalgias.   Skin:  Negative for color change and rash.   Neurological:  Negative for seizures and syncope.       Current Medications       Current Outpatient Medications:     amoxicillin (AMOXIL) 500 mg capsule, Take 2 capsules (1,000 mg total) by mouth every 8 (eight) hours for 7 days, Disp: 42 capsule, Rfl: 0    buPROPion (Wellbutrin XL) 150 mg 24 hr tablet, Take 1 tablet (150 mg total) by mouth daily, Disp: 90  tablet, Rfl: 3    Cholecalciferol 1.25 MG (02526 UT) TABS, Take Vit D 47785 units weekly for 8 weeks and then continue on maintenance dose of Vit D 5000 units daily, Disp: 8 tablet, Rfl: 0    Levonorgestrel (MIRENA) 20 MCG/DAY IUD, 1 each by Intrauterine route once , Disp: , Rfl:     Multiple Vitamin (MULTIVITAMIN ADULT PO), Take by mouth, Disp: , Rfl:     naltrexone (REVIA) 50 mg tablet, Take 1/2 a tablet a day for 2 weeks and then take full tablet a day, Disp: 30 tablet, Rfl: 2    Current Allergies     Allergies as of 2024 - Reviewed 2024   Allergen Reaction Noted    Sulfa antibiotics Other (See Comments) 06/15/2022            The following portions of the patient's history were reviewed and updated as appropriate: allergies, current medications, past family history, past medical history, past social history, past surgical history and problem list.     No past medical history on file.    Past Surgical History:   Procedure Laterality Date     SECTION, LOW TRANSVERSE         Family History   Problem Relation Age of Onset    Hypertension Mother     Hyperlipidemia Mother     Obesity Mother     Transient ischemic attack Mother     Thyroid disease unspecified Mother         [email or web address]    Osteoporosis Mother     Hypertension Father     Hyperlipidemia Father     Heart failure Maternal Grandfather     Stroke Maternal Grandfather     Osteoporosis Maternal Grandmother     Hyperlipidemia Brother        Medications have been verified.    Objective     /71   Pulse 84   Temp (!) 96.8 °F (36 °C) (Tympanic)   Resp 18   SpO2 98%   No LMP recorded. Patient has had an implant.     Physical Exam     Physical Exam  Constitutional:       General: She is not in acute distress.     Appearance: Normal appearance. She is normal weight. She is not ill-appearing, toxic-appearing or diaphoretic.   HENT:      Head: Normocephalic and atraumatic.      Right Ear: Ear canal and external ear normal. There is no impacted  cerumen. Tympanic membrane is erythematous and bulging.      Left Ear: Tympanic membrane, ear canal and external ear normal. There is no impacted cerumen.      Nose: Congestion and rhinorrhea present.      Mouth/Throat:      Mouth: Mucous membranes are moist.      Pharynx: Posterior oropharyngeal erythema present. No oropharyngeal exudate.   Eyes:      General: No scleral icterus.        Right eye: No discharge.         Left eye: No discharge.      Conjunctiva/sclera: Conjunctivae normal.   Cardiovascular:      Rate and Rhythm: Normal rate and regular rhythm.      Pulses: Normal pulses.      Heart sounds: Normal heart sounds. No murmur heard.     No friction rub. No gallop.   Pulmonary:      Effort: Pulmonary effort is normal. No respiratory distress.      Breath sounds: Normal breath sounds. No stridor. No wheezing, rhonchi or rales.   Chest:      Chest wall: No tenderness.   Musculoskeletal:         General: Normal range of motion.      Cervical back: Normal range of motion and neck supple. No rigidity or tenderness.   Lymphadenopathy:      Cervical: No cervical adenopathy.   Skin:     General: Skin is warm and dry.      Capillary Refill: Capillary refill takes less than 2 seconds.      Coloration: Skin is not jaundiced.      Findings: No bruising or rash.   Neurological:      General: No focal deficit present.      Mental Status: She is alert. Mental status is at baseline.   Psychiatric:         Mood and Affect: Mood normal.         Behavior: Behavior normal.

## 2024-02-05 DIAGNOSIS — E66.9 CLASS II OBESITY: ICD-10-CM

## 2024-02-05 RX ORDER — NALTREXONE HYDROCHLORIDE 50 MG/1
TABLET, FILM COATED ORAL
Qty: 30 TABLET | Refills: 2 | Status: SHIPPED | OUTPATIENT
Start: 2024-02-05

## 2024-02-06 ENCOUNTER — APPOINTMENT (OUTPATIENT)
Dept: LAB | Facility: HOSPITAL | Age: 39
End: 2024-02-06
Payer: COMMERCIAL

## 2024-02-06 ENCOUNTER — HOSPITAL ENCOUNTER (OUTPATIENT)
Dept: RADIOLOGY | Facility: HOSPITAL | Age: 39
Discharge: HOME | End: 2024-02-06
Payer: COMMERCIAL

## 2024-02-06 DIAGNOSIS — Z00.6 ENCOUNTER FOR EXAMINATION FOR NORMAL COMPARISON OR CONTROL IN CLINICAL RESEARCH PROGRAM: ICD-10-CM

## 2024-02-06 DIAGNOSIS — M25.50 ARTHRALGIA, UNSPECIFIED JOINT: ICD-10-CM

## 2024-02-06 DIAGNOSIS — R53.83 OTHER FATIGUE: ICD-10-CM

## 2024-02-06 PROCEDURE — 73130 X-RAY EXAM OF HAND: CPT

## 2024-02-06 PROCEDURE — 36415 COLL VENOUS BLD VENIPUNCTURE: CPT

## 2024-03-18 ENCOUNTER — TELEPHONE (OUTPATIENT)
Dept: BARIATRICS | Facility: CLINIC | Age: 39
End: 2024-03-18

## 2024-03-18 LAB
APOB+LDLR+PCSK9 GENE MUT ANL BLD/T: NOT DETECTED
BRCA1+BRCA2 DEL+DUP + FULL MUT ANL BLD/T: NOT DETECTED
MLH1+MSH2+MSH6+PMS2 GN DEL+DUP+FUL M: NOT DETECTED

## 2024-03-19 DIAGNOSIS — E66.9 CLASS II OBESITY: Primary | ICD-10-CM

## 2024-03-19 NOTE — TELEPHONE ENCOUNTER
PA for Wegovy 0.25mg Inj cancelled due to     [x]Approval on file-dates approved     []Medication already on Formulary  []Brand Name Preferred  []Patient no longer covered by insurance    Patient advised by     [x]My Chart Message  [x]Phone call    Message sent to office clinical pool   No      Scanned into Media  no

## 2024-04-03 DIAGNOSIS — F41.9 ANXIETY: Primary | ICD-10-CM

## 2024-04-03 RX ORDER — BUPROPION HYDROCHLORIDE 300 MG/1
300 TABLET ORAL DAILY
Qty: 90 TABLET | Refills: 3 | Status: SHIPPED | OUTPATIENT
Start: 2024-04-03 | End: 2025-04-03

## 2024-04-10 ENCOUNTER — APPOINTMENT (OUTPATIENT)
Dept: LAB | Facility: HOSPITAL | Age: 39
End: 2024-04-10
Payer: COMMERCIAL

## 2024-04-10 ENCOUNTER — OFFICE VISIT (OUTPATIENT)
Dept: PLASTIC SURGERY | Facility: CLINIC | Age: 39
End: 2024-04-10

## 2024-04-10 VITALS
DIASTOLIC BLOOD PRESSURE: 79 MMHG | HEART RATE: 86 BPM | SYSTOLIC BLOOD PRESSURE: 121 MMHG | TEMPERATURE: 98.3 F | BODY MASS INDEX: 39.68 KG/M2 | HEIGHT: 63 IN

## 2024-04-10 DIAGNOSIS — Z00.8 HEALTH EXAMINATION IN POPULATION SURVEY: ICD-10-CM

## 2024-04-10 DIAGNOSIS — L98.9 SKIN LESION: Primary | ICD-10-CM

## 2024-04-10 LAB
CHOLEST SERPL-MCNC: 226 MG/DL
EST. AVERAGE GLUCOSE BLD GHB EST-MCNC: 114 MG/DL
HBA1C MFR BLD: 5.6 %
HDLC SERPL-MCNC: 42 MG/DL
LDLC SERPL CALC-MCNC: 162 MG/DL (ref 0–100)
NONHDLC SERPL-MCNC: 184 MG/DL
TRIGL SERPL-MCNC: 112 MG/DL

## 2024-04-10 PROCEDURE — 88305 TISSUE EXAM BY PATHOLOGIST: CPT | Performed by: STUDENT IN AN ORGANIZED HEALTH CARE EDUCATION/TRAINING PROGRAM

## 2024-04-10 PROCEDURE — 80061 LIPID PANEL: CPT

## 2024-04-10 PROCEDURE — 88342 IMHCHEM/IMCYTCHM 1ST ANTB: CPT | Performed by: STUDENT IN AN ORGANIZED HEALTH CARE EDUCATION/TRAINING PROGRAM

## 2024-04-10 PROCEDURE — 88312 SPECIAL STAINS GROUP 1: CPT | Performed by: STUDENT IN AN ORGANIZED HEALTH CARE EDUCATION/TRAINING PROGRAM

## 2024-04-10 PROCEDURE — 36415 COLL VENOUS BLD VENIPUNCTURE: CPT

## 2024-04-10 PROCEDURE — 83036 HEMOGLOBIN GLYCOSYLATED A1C: CPT

## 2024-04-10 PROCEDURE — 88341 IMHCHEM/IMCYTCHM EA ADD ANTB: CPT | Performed by: STUDENT IN AN ORGANIZED HEALTH CARE EDUCATION/TRAINING PROGRAM

## 2024-04-11 PROBLEM — L98.9 SKIN LESION: Status: ACTIVE | Noted: 2024-04-11

## 2024-04-11 NOTE — PROGRESS NOTES
Biopsy    Date/Time: 4/10/2024 3:30 PM    Performed by: Em Viveros PA-C  Authorized by: Em Viveros PA-C  Universal Protocol:  Consent: Verbal consent obtained.  Risks and benefits: risks, benefits and alternatives were discussed  Consent given by: patient  Patient understanding: patient states understanding of the procedure being performed  Patient consent: the patient's understanding of the procedure matches consent given  Procedure consent: procedure consent matches procedure scheduled  Patient identity confirmed: verbally with patient    Procedure Details - Lesion Biopsy:     Body area:  Head/neck    Head/neck location:  R cheek    Biopsy method: punch biopsy      Biopsy tissue type: skin    Initial size (mm):  2    Final defect size (mm):  2    Malignancy: malignancy unknown

## 2024-04-11 NOTE — PROGRESS NOTES
"Assessment/Plan:    Pt is a 39 YOF who presents as a new patient for evaluation of a skin lesion of the right cheek. Area was biopsied. We will call patient with results and processed as appopriate.     No problem-specific Assessment & Plan notes found for this encounter.       Diagnoses and all orders for this visit:    Skin lesion  -     Tissue Exam; Future  -     Tissue Exam          Subjective:      Patient ID: Summer Colbert is a 39 y.o. female.    HPI    Patient reports a skin lesion of the right cheek for the past several months.  She reports that it is itchy but not painful, does not drain, has not changed in size or shape.  She has not seen a dermatologist in 3 years since moving to the area but \"has had things removed in the past\".     Upon evaluation, the patient has a raised, scaling lesion of the right cheek measuring approx 0.5 x 0.5 cm. Please see photo in media.     The following portions of the patient's history were reviewed and updated as appropriate: She  has no past medical history on file.  She   Patient Active Problem List    Diagnosis Date Noted    Skin lesion 2024    Positive TONG (antinuclear antibody) 2023    Hirsutism 2023    Vitamin D deficiency 2023    Sleep apnea 2023    Arthralgia 2023    Hyperlipidemia 10/18/2022    Other fatigue 10/18/2022    Class II obesity 10/18/2022    Anxiety 10/18/2022    Encounter for surveillance of abnormal nevi 10/18/2022    IUD (intrauterine device) in place 2022     She  has a past surgical history that includes  section, low transverse.  Her family history includes Heart failure in her maternal grandfather; Hyperlipidemia in her brother, father, and mother; Hypertension in her father and mother; Obesity in her mother; Osteoporosis in her maternal grandmother and mother; Stroke in her maternal grandfather; Thyroid disease unspecified in her mother; Transient ischemic attack in her mother.  She  reports " that she quit smoking about 14 years ago. Her smoking use included cigarettes. She started smoking about 18 years ago. She has a 1 pack-year smoking history. She has never used smokeless tobacco. She reports that she does not currently use alcohol. She reports that she does not use drugs.  Current Outpatient Medications   Medication Sig Dispense Refill    buPROPion (Wellbutrin XL) 300 mg 24 hr tablet Take 1 tablet (300 mg total) by mouth daily 90 tablet 3    Cholecalciferol 1.25 MG (30217 UT) TABS Take Vit D 32242 units weekly for 8 weeks and then continue on maintenance dose of Vit D 5000 units daily 8 tablet 0    Levonorgestrel (MIRENA) 20 MCG/DAY IUD 1 each by Intrauterine route once       Multiple Vitamin (MULTIVITAMIN ADULT PO) Take by mouth      Semaglutide-Weight Management (WEGOVY) 0.25 MG/0.5ML Inject 0.5 mL (0.25 mg total) under the skin once a week (Patient not taking: Reported on 4/10/2024) 2 mL 0     No current facility-administered medications for this visit.     Current Outpatient Medications on File Prior to Visit   Medication Sig    buPROPion (Wellbutrin XL) 300 mg 24 hr tablet Take 1 tablet (300 mg total) by mouth daily    Cholecalciferol 1.25 MG (79635 UT) TABS Take Vit D 74615 units weekly for 8 weeks and then continue on maintenance dose of Vit D 5000 units daily    Levonorgestrel (MIRENA) 20 MCG/DAY IUD 1 each by Intrauterine route once     Multiple Vitamin (MULTIVITAMIN ADULT PO) Take by mouth    Semaglutide-Weight Management (WEGOVY) 0.25 MG/0.5ML Inject 0.5 mL (0.25 mg total) under the skin once a week (Patient not taking: Reported on 4/10/2024)     No current facility-administered medications on file prior to visit.     She is allergic to sulfa antibiotics..    Review of Systems    A 12 point ROS was completed and is negative except as per HPI     Objective:      /79 (BP Location: Right arm, Patient Position: Sitting, Cuff Size: Standard)   Pulse 86   Temp 98.3 °F (36.8 °C)  "(Temporal)   Ht 5' 3\" (1.6 m)   BMI 39.68 kg/m²          Physical Exam  Vitals and nursing note reviewed.   Constitutional:       General: She is not in acute distress.     Appearance: Normal appearance. She is not ill-appearing.   HENT:      Head: Normocephalic and atraumatic.      Nose: Nose normal.      Mouth/Throat:      Mouth: Mucous membranes are moist.   Eyes:      Extraocular Movements: Extraocular movements intact.      Conjunctiva/sclera: Conjunctivae normal.      Pupils: Pupils are equal, round, and reactive to light.   Neck:      Vascular: No carotid bruit.   Cardiovascular:      Rate and Rhythm: Normal rate and regular rhythm.      Pulses: Normal pulses.      Heart sounds: Normal heart sounds.   Pulmonary:      Effort: Pulmonary effort is normal. No respiratory distress.      Breath sounds: Normal breath sounds.   Abdominal:      General: Abdomen is flat.      Palpations: Abdomen is soft.   Musculoskeletal:         General: No swelling, tenderness, deformity or signs of injury. Normal range of motion.      Cervical back: Normal range of motion and neck supple. No rigidity or tenderness.      Right lower leg: No edema.      Left lower leg: No edema.   Lymphadenopathy:      Cervical: No cervical adenopathy.   Skin:     General: Skin is warm and dry.      Comments: See HPI    Neurological:      General: No focal deficit present.      Mental Status: She is alert and oriented to person, place, and time.      Cranial Nerves: No cranial nerve deficit.      Sensory: No sensory deficit.      Motor: No weakness.   Psychiatric:         Mood and Affect: Mood normal.         Behavior: Behavior normal.         "

## 2024-04-16 PROCEDURE — 88341 IMHCHEM/IMCYTCHM EA ADD ANTB: CPT | Performed by: STUDENT IN AN ORGANIZED HEALTH CARE EDUCATION/TRAINING PROGRAM

## 2024-04-16 PROCEDURE — 88342 IMHCHEM/IMCYTCHM 1ST ANTB: CPT | Performed by: STUDENT IN AN ORGANIZED HEALTH CARE EDUCATION/TRAINING PROGRAM

## 2024-04-16 PROCEDURE — 88312 SPECIAL STAINS GROUP 1: CPT | Performed by: STUDENT IN AN ORGANIZED HEALTH CARE EDUCATION/TRAINING PROGRAM

## 2024-04-16 PROCEDURE — 88305 TISSUE EXAM BY PATHOLOGIST: CPT | Performed by: STUDENT IN AN ORGANIZED HEALTH CARE EDUCATION/TRAINING PROGRAM

## 2024-04-17 ENCOUNTER — TELEPHONE (OUTPATIENT)
Dept: PLASTIC SURGERY | Facility: CLINIC | Age: 39
End: 2024-04-17

## 2024-04-17 NOTE — TELEPHONE ENCOUNTER
Called patient and left voicemail with tissue biopsy results which were benign. Patient will follow up as needed.

## 2024-05-09 ENCOUNTER — OFFICE VISIT (OUTPATIENT)
Dept: SLEEP CENTER | Facility: CLINIC | Age: 39
End: 2024-05-09
Payer: COMMERCIAL

## 2024-05-09 VITALS
WEIGHT: 224 LBS | SYSTOLIC BLOOD PRESSURE: 120 MMHG | BODY MASS INDEX: 39.69 KG/M2 | DIASTOLIC BLOOD PRESSURE: 78 MMHG | HEIGHT: 63 IN

## 2024-05-09 DIAGNOSIS — G47.39 SLEEP APNEA-LIKE BEHAVIOR: Primary | ICD-10-CM

## 2024-05-09 DIAGNOSIS — G47.00 INSOMNIA, UNSPECIFIED TYPE: ICD-10-CM

## 2024-05-09 DIAGNOSIS — G47.19 EXCESSIVE DAYTIME SLEEPINESS: ICD-10-CM

## 2024-05-09 DIAGNOSIS — R53.83 OTHER FATIGUE: ICD-10-CM

## 2024-05-09 PROCEDURE — 99245 OFF/OP CONSLTJ NEW/EST HI 55: CPT | Performed by: NURSE PRACTITIONER

## 2024-05-09 NOTE — PROGRESS NOTES
Consultation - [facility]  Summer Colbert, 1985, MRN: [id number]    5/9/2024        Reason for Consult / Principal Problem:    Evaluation of possible Obstructive Sleep Apnea  Excessive daytime sleepiness       Thank you for the opportunity of participating in the evaluation and care of this patient in the Sleep Clinic at [facility].      Subjective:     HPI: Summer Colbert is a 39 y.o. year old female.  She presents for a consultation regarding excessive daytime sleepiness and possible obstructive sleep apnea.  She is working with weight management regarding medical weight loss and daytime sleepiness and intermittent snoring were discussed.  STOPBANG noted at 2/8.    She reports frequent night time awakenings with return to sleep within a few minutes.  She does not feel refreshed when waking up and feels very tired in the evenings.  She suffers from depression and anxiety and takes wellbutrin XL 300mg daily.  She feels depression may be playing a part in decreased energy levels.      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, M-F    Sleep Schedule:       Bedtime:  9:00-10:00pm      Latency:  within less than 30 minutes      Wakeup time:  6:00am on work days, 7:00am on days off    Awakenings:       Frequency:  multiple - frequent      Causes:  children, dreams      Duration:  returns to sleep without difficulty    Daytime Sleepiness / Inappropriate Sleep:       Most severe:  evenings - 5:00-6;00pm       Naps :  no time in schedule for naps      Inappropriate drowsiness / sleep:  she dozes in the evening if sedentary    Snoring:  rarely,  "mainly when sick or having allergy symptoms    Apnea:  no witnessed apnea    Change in Weight:  recently intentionally lost 15 lbs    Restless Leg Syndrome:  occasionally has clinical symptoms consistent with this diagnosis when getting into bed.  Stretching or walking may relieve.    Other Complaints:  she talks in her sleep.  No reports of sleep walking,  sleep paralysis or hallucinations surrounding sleep.  She wakes up with headaches several times per week.  She reports bruxism with no use of an oral appliance.      Social History:      Caffeine:  12 ounces of coffee daily in the am, 8 ounces of soda in the afternoon, latest serving by 3:00pm, decaf tea       Tobacco:   reports that she quit smoking about 14 years ago. Her smoking use included cigarettes. She started smoking about 18 years ago. She has a 1 pack-year smoking history. She has never used smokeless tobacco.     E-cig/Vaping:    E-Cigarette/Vaping    E-Cigarette Use Never User       E-Cigarette/Vaping Substances    Nicotine No     THC No     CBD No     Flavoring No     Other No     Unknown No          Alcohol:   reports that she does not currently use alcohol. none      Drugs:   reports no history of drug use.       The review of systems and following portions of the patient's history were reviewed and updated as appropriate: allergies, current medications, past family history, past medical history, past social history, past surgical history and problem list.        Objective:       Vitals:    05/09/24 0746   BP: 120/78   Weight: 102 kg (224 lb)   Height: 5' 3\" (1.6 m)     Body mass index is 39.68 kg/m².  Neck Circumference: 15  [place]ale Sleepiness Scale: Total score: 9      Current Outpatient Medications:     buPROPion (Wellbutrin XL) 300 mg 24 hr tablet, Take 1 tablet (300 mg total) by mouth daily, Disp: 90 tablet, Rfl: 3    Levonorgestrel (MIRENA) 20 MCG/DAY IUD, 1 each by Intrauterine route once , Disp: , Rfl:     Multiple Vitamin (MULTIVITAMIN " ADULT PO), Take by mouth, Disp: , Rfl:     Physical Exam  General Appearance:   Alert, cooperative, no distress, appears stated age, obese     Head:   Normocephalic, without obvious abnormality, atraumatic     Eyes:   Conjunctiva/corneas clear          Nose:  Nares normal, septum midline, mucosa normal, no drainage or sinus tenderness           Throat:  Lips, teeth and gums normal; tongue normal in size and midline in position; mucosa moist with low lying soft palatal tissue, uvula normal, tonsils absent, Mallampati class 3       Neck:  Supple, short, symmetrical, trachea midline, no adenopathy; no thyromegaly noted, no carotid bruit or JVD     Lungs:      Clear to auscultation bilaterally, respirations unlabored     Heart:   Regular rate and rhythm, S1 and S2 normal, no murmur, rub or gallop       Extremities:  Extremities normal, atraumatic, no cyanosis or edema       Skin:  Skin color, texture, turgor normal, no rashes or lesions       Neurologic:  No focal deficits noted.          ASSESSMENT / PLAN     1. Sleep apnea-like behavior  Ambulatory referral to Sleep Medicine    Home Study      2. Excessive daytime sleepiness  Home Study      3. Insomnia, unspecified type  Home Study      4. Other fatigue  Ambulatory referral to Sleep Medicine    Home Study            Counseling / Coordination of Care  I have spent a total time of 50 minutes on 05/09/24 in caring for this patient including Risks and benefits of tx options, Instructions for management, Patient and family education, Importance of tx compliance, Risk factor reductions, Impressions, Counseling / Coordination of care, Documenting in the medical record, Reviewing / ordering tests, medicine, procedures  , and Obtaining or reviewing history  .    Today we discussed the anatomy and physiology of the upper airway.  I pointed out how changes in this region can result in both snoring and abnormal breathing events including apneas and hypopneas.  I explained the  most common co-morbidities of untreated sleep apnea.  After this we talked about some forms of treatment including application of positive airway pressure, mandibular advancement devices and surgery.  She would consider use of PAP therapy, if YESSICA is confirmed, but has some reservations about use.  She would likely do best to start with a nasal cradle mask.    In order to evaluate the possibility of Obstructive Sleep Apnea as a cause of the patient's symptoms, a home sleep study will be completed to identify the presence or absence of abnormal nocturnal breathing.  If significant abnormal nocturnal breathing is detected, nasal CPAP will be titrated to find the optimum pressure needed to maintain upper airway patency during sleep.  This may be accomplished using APAP or may require an in lab titration study.  Following testing, the patient will return to the Sleep Disorders Center for set up of CPAP equipment with follow up visit to review compliance and effectiveness of treatment 31-91 days after set up.       The following instructions have been given to the patient today:    Patient Instructions    Schedule home sleep study  After testing, the nurse will call with results and recommendations for plan of treatment       Nursing Support:  When: Monday through Friday 7A-5PM except holidays  Where: Our direct line is [phone].    If you are having a true emergency please call 911.  In the event that the line is busy or it is after hours please leave a voice message and we will return your call.  Please speak clearly, leaving your full name, birth date, best number to reach you and the reason for your call.   Medication refills: We will need the name of the medication, the dosage, the ordering provider, whether you get a 30 or 90 day refill, and the pharmacy name and address.  Medications will be ordered by the provider only.  Nurses cannot call in prescriptions.  Please allow 7 days for medication  "refills.  Physician requested updates: If your provider requested that you call with an update after starting medication, please be ready to provide us the medication and dosage, what time you take your medication, the time you attempt to fall asleep, time you fall asleep, when you wake up, and what time you get out of bed.  Sleep Study Results: We will contact you with sleep study results and/or next steps after the physician has reviewed your testing.      INA Goss  [facility]      Portions of the record may have been created with voice recognition software. Occasional wrong word or \"sound a like\" substitutions may have occurred due to the inherent limitations of voice recognition software. Read the chart carefully and recognize, using context, where substitutions have occurred.               "

## 2024-05-09 NOTE — PATIENT INSTRUCTIONS
Schedule home sleep study  After testing, the nurse will call with results and recommendations for plan of treatment       Nursing Support:  When: Monday through Friday 7A-5PM except holidays  Where: Our direct line is [phone].    If you are having a true emergency please call 911.  In the event that the line is busy or it is after hours please leave a voice message and we will return your call.  Please speak clearly, leaving your full name, birth date, best number to reach you and the reason for your call.   Medication refills: We will need the name of the medication, the dosage, the ordering provider, whether you get a 30 or 90 day refill, and the pharmacy name and address.  Medications will be ordered by the provider only.  Nurses cannot call in prescriptions.  Please allow 7 days for medication refills.  Physician requested updates: If your provider requested that you call with an update after starting medication, please be ready to provide us the medication and dosage, what time you take your medication, the time you attempt to fall asleep, time you fall asleep, when you wake up, and what time you get out of bed.  Sleep Study Results: We will contact you with sleep study results and/or next steps after the physician has reviewed your testing.

## 2024-05-20 ENCOUNTER — OFFICE VISIT (OUTPATIENT)
Dept: FAMILY MEDICINE CLINIC | Facility: CLINIC | Age: 39
End: 2024-05-20
Payer: COMMERCIAL

## 2024-05-20 VITALS
TEMPERATURE: 97.9 F | HEART RATE: 94 BPM | DIASTOLIC BLOOD PRESSURE: 100 MMHG | BODY MASS INDEX: 38.1 KG/M2 | HEIGHT: 64 IN | OXYGEN SATURATION: 98 % | WEIGHT: 223.2 LBS | SYSTOLIC BLOOD PRESSURE: 140 MMHG

## 2024-05-20 DIAGNOSIS — E56.9 VITAMIN DEFICIENCY: ICD-10-CM

## 2024-05-20 DIAGNOSIS — G51.4 FACIAL TWITCHING: ICD-10-CM

## 2024-05-20 DIAGNOSIS — Z13.0 SCREENING, ANEMIA, DEFICIENCY, IRON: ICD-10-CM

## 2024-05-20 DIAGNOSIS — R00.2 PALPITATIONS: ICD-10-CM

## 2024-05-20 DIAGNOSIS — F41.9 ANXIETY: Primary | ICD-10-CM

## 2024-05-20 DIAGNOSIS — R07.89 CHEST TIGHTNESS: ICD-10-CM

## 2024-05-20 PROCEDURE — 99214 OFFICE O/P EST MOD 30 MIN: CPT | Performed by: NURSE PRACTITIONER

## 2024-05-20 PROCEDURE — 93000 ELECTROCARDIOGRAM COMPLETE: CPT | Performed by: NURSE PRACTITIONER

## 2024-05-20 RX ORDER — BUPROPION HYDROCHLORIDE 100 MG/1
100 TABLET, EXTENDED RELEASE ORAL 2 TIMES DAILY
Qty: 180 TABLET | Refills: 1 | Status: SHIPPED | OUTPATIENT
Start: 2024-05-20

## 2024-05-20 NOTE — PROGRESS NOTES
Ambulatory Visit  Name: Summer Colbert      : 1985      MRN: [id number]  Encounter Provider: INA Kahn  Encounter Date: 2024   Encounter department: [facility]    Assessment & Plan   1. Anxiety  Assessment & Plan:  Symptoms discussed at length tonight  They appear at least partially anxiety/stress driven  Physical assessment unremarkable  EKG normal  Will change Wellbutrin today  Start SR: 100 BID  F/U 4 weeks - reassess symptoms  Return to care sooner wit concerns    ER precaustions for any acute changes  Orders:  -     buPROPion (Wellbutrin SR) 100 mg 12 hr tablet; Take 1 tablet (100 mg total) by mouth 2 (two) times a day  2. Vitamin deficiency  -     Vitamin D 25 hydroxy; Future  -     Vitamin B12; Future  3. Facial twitching  -     TSH, 3rd generation; Future  -     T4, free; Future  -     Magnesium; Future  -     CBC and differential; Future  -     TIBC Panel (incl. Iron, TIBC, % Iron Saturation); Future  -     Comprehensive metabolic panel; Future  4. Screening, anemia, deficiency, iron  -     CBC and differential; Future  -     TIBC Panel (incl. Iron, TIBC, % Iron Saturation); Future  5. Chest tightness  -     POCT ECG  6. Palpitations  -     Holter monitor; Future; Expected date: 2024      Depression Screening and Follow-up Plan: Patient was screened for depression during today's encounter. They screened negative with a PHQ-2 score of 1.      History of Present Illness     Acute visit  Presents today with several concerns.   Has been noting intermittent chest pain/tightness over the last few weeks.  No exacerbating factors.  Nothing alleviates.  Spontaneously resolves.  Occurs at random times-mostly when at rest.  Reports history of intermittent tingling, left hand-primarily middle and ring finger. Most recently has noted right eye twitching as well.  She does admit to increased anxiety and stress.  She has been under increased stress since moving  "to this area about 2 and half years ago.  [place]  [place] made this more difficult.  She is currently working as a nurse within the network.  She does like her job, but she is the primary financial support of the family, so there is stress in regards to that as well.  She has not really made too many friends in this area, due to her work schedule. Feels somewhat isolated at times.  Her daughter is currently having problems in school-there appears to be some bullying. She is generally overwhelmed at times and has concern it is affecting her health        Review of Systems   Constitutional:  Positive for fatigue. Negative for activity change, appetite change and fever.   Respiratory: Negative.     Cardiovascular:  Positive for chest pain and palpitations.   Neurological: Negative.         Hand tingling  Facial twitching   Psychiatric/Behavioral:  Positive for sleep disturbance. Negative for self-injury and suicidal ideas. The patient is nervous/anxious.      Medical History Reviewed by provider this encounter:       Current Outpatient Medications on File Prior to Visit   Medication Sig Dispense Refill    Levonorgestrel (MIRENA) 20 MCG/DAY IUD 1 each by Intrauterine route once       Multiple Vitamin (MULTIVITAMIN ADULT PO) Take by mouth       No current facility-administered medications on file prior to visit.      Objective     /100 (BP Location: Left arm, Patient Position: Sitting, Cuff Size: Adult)   Pulse 94   Temp 97.9 °F (36.6 °C)   Ht 5' 4\" (1.626 m)   Wt 101 kg (223 lb 3.2 oz)   SpO2 98%   BMI 38.31 kg/m²     Note BP elevated - likely stress induced tonight. Pt will check out side office environment. 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 She will follow up 4 weeks to reassess symptoms and recheck BP.       Physical Exam  Vitals and nursing note reviewed.   Constitutional:       General: She is not in acute distress.     Appearance: She " is well-developed and well-groomed. She is not ill-appearing.   Eyes:      Extraocular Movements: Extraocular movements intact.      Conjunctiva/sclera: Conjunctivae normal.      Pupils: Pupils are equal, round, and reactive to light.   Neck:      Thyroid: No thyromegaly.   Cardiovascular:      Rate and Rhythm: Normal rate and regular rhythm.      Heart sounds: Normal heart sounds.   Pulmonary:      Effort: Pulmonary effort is normal. No respiratory distress.      Breath sounds: Normal breath sounds and air entry.   Skin:     General: Skin is warm and dry.   Neurological:      General: No focal deficit present.      Mental Status: She is alert and oriented to person, place, and time.      Cranial Nerves: Cranial nerves 2-12 are intact.      Sensory: Sensation is intact.   Psychiatric:         Attention and Perception: Attention normal.         Mood and Affect: Mood normal.         Behavior: Behavior normal.       Administrative Statements

## 2024-05-22 ENCOUNTER — OFFICE VISIT (OUTPATIENT)
Dept: BARIATRICS | Facility: CLINIC | Age: 39
End: 2024-05-22
Payer: COMMERCIAL

## 2024-05-22 VITALS
BODY MASS INDEX: 39.41 KG/M2 | RESPIRATION RATE: 17 BRPM | HEIGHT: 63 IN | SYSTOLIC BLOOD PRESSURE: 127 MMHG | TEMPERATURE: 96.8 F | DIASTOLIC BLOOD PRESSURE: 56 MMHG | WEIGHT: 222.4 LBS | HEART RATE: 92 BPM

## 2024-05-22 DIAGNOSIS — E66.9 CLASS II OBESITY: Primary | ICD-10-CM

## 2024-05-22 DIAGNOSIS — E78.5 HYPERLIPIDEMIA, UNSPECIFIED HYPERLIPIDEMIA TYPE: ICD-10-CM

## 2024-05-22 DIAGNOSIS — G47.00 INSOMNIA: ICD-10-CM

## 2024-05-22 PROCEDURE — 99214 OFFICE O/P EST MOD 30 MIN: CPT | Performed by: PHYSICIAN ASSISTANT

## 2024-05-22 NOTE — ASSESSMENT & PLAN NOTE
Symptoms discussed at length tonight  They appear at least partially anxiety/stress driven  Physical assessment unremarkable  EKG normal  Will change Wellbutrin today  Start SR: 100 BID  F/U 4 weeks - reassess symptoms  Return to care sooner wit concerns    ER precaustions for any acute changes

## 2024-05-22 NOTE — PROGRESS NOTES
Assessment/Plan:    Class II obesity  -Patient is pursuing Conservative Program with RD meal planning  -Initial weight loss goal of 5-10% weight loss for improved health  -Screening labs  up to date    Initial:240lb  Last Visit:224  Current:222.4  Change:-17.6lb    Goals:  -Recommend to continue to food log (ie.) [email or web address] caloreis  - To continue to drink at least 64oz of water daily.No sugary beverages.  -continue to incorporate exercise    She will start on wegovy now that supply has improved. Discussed possible side effects with medication  Contraception: IUD  Medication contract signed prior  Past medication: naltrexone (nausea/vomiting)      Insomnia  Seeing sleep medicine and going for study    Hyperlipidemia  Reviewed from 4/10/24  -should improve with weight loss, dietary, and lifestyle changes          Follow up in approximately  monthly weight checks and 5 months   with Non-Surgical Physician/Advanced Practitioner.     Diagnoses and all orders for this visit:    Class II obesity  -     Semaglutide-Weight Management (WEGOVY) 0.25 MG/0.5ML; Inject 0.5 mL (0.25 mg total) under the skin once a week    Insomnia    Hyperlipidemia, unspecified hyperlipidemia type          Subjective:   Chief Complaint   Patient presents with    Follow-up     MWM-4M F/u;Waist-40in        Patient ID: Summer Colbert  is a 39 y.o. female with excess weight/obesity here to pursue weight managment.  Patient is pursuing Conservative Program.     HPI  She never got to start wegovy due to supply .  She would like to try the medication. She often gets discouraged for diet planning and does feel support is helpful.   She is taking wellbutrin for mood d/o and was on naltrexone with it but was sick.  She is still on the wellbutrin and dose is being titrated.   She did meet with RD.  She is trying to make good food choices    Wt Readings from Last 10 Encounters:   05/22/24 101 kg (222 lb 6.4 oz)    24 101 kg (223 lb 3.2 oz)   24 102 kg (224 lb)   24 102 kg (224 lb)   23 104 kg (230 lb 1.6 oz)   10/23/23 104 kg (229 lb)   10/18/23 104 kg (229 lb)   10/17/23 104 kg (228 lb 12.8 oz)   23 107 kg (236 lb 1.6 oz)   23 109 kg (240 lb 12.8 oz)       Food logging: yes restarted  Increased appetite/cravings:  Exercise:getting at least 30 minutes a day .  Walking if weater is ok or biking  Hydration:water, coffee w/protein, diet pepsi sometimes     Eggwhite and vegetable omlet  Fit and light yogurt     The following portions of the patient's history were reviewed and updated as appropriate: She  has no past medical history on file.  She   Patient Active Problem List    Diagnosis Date Noted    Excessive daytime sleepiness 2024    Insomnia 2024    Skin lesion 2024    Positive TONG (antinuclear antibody) 2023    Hirsutism 2023    Vitamin D deficiency 2023    Sleep apnea-like behavior 2023    Arthralgia 2023    Hyperlipidemia 10/18/2022    Other fatigue 10/18/2022    Class II obesity 10/18/2022    Anxiety 10/18/2022    Encounter for surveillance of abnormal nevi 10/18/2022    IUD (intrauterine device) in place 2022     She  has a past surgical history that includes  section, low transverse.  Her family history includes Heart failure in her maternal grandfather; Hyperlipidemia in her brother, father, and mother; Hypertension in her father and mother; Obesity in her mother; Osteoporosis in her maternal grandmother and mother; Stroke in her maternal grandfather; Thyroid disease unspecified in her mother; Transient ischemic attack in her mother.  She  reports that she quit smoking about 14 years ago. Her smoking use included cigarettes. She started smoking about 18 years ago. She has a 1 pack-year smoking history. She has never used smokeless tobacco. She reports that she does not currently use alcohol. She reports that she does  "not use drugs.  Current Outpatient Medications   Medication Sig Dispense Refill    buPROPion (Wellbutrin SR) 100 mg 12 hr tablet Take 1 tablet (100 mg total) by mouth 2 (two) times a day 180 tablet 1    Levonorgestrel (MIRENA) 20 MCG/DAY IUD 1 each by Intrauterine route once       Multiple Vitamin (MULTIVITAMIN ADULT PO) Take by mouth      Semaglutide-Weight Management (WEGOVY) 0.25 MG/0.5ML Inject 0.5 mL (0.25 mg total) under the skin once a week 2 mL 0     No current facility-administered medications for this visit.     Current Outpatient Medications on File Prior to Visit   Medication Sig    buPROPion (Wellbutrin SR) 100 mg 12 hr tablet Take 1 tablet (100 mg total) by mouth 2 (two) times a day    Levonorgestrel (MIRENA) 20 MCG/DAY IUD 1 each by Intrauterine route once     Multiple Vitamin (MULTIVITAMIN ADULT PO) Take by mouth     No current facility-administered medications on file prior to visit.     She is allergic to sulfa antibiotics..    Review of Systems   Constitutional:  Negative for fever.   Respiratory:  Negative for shortness of breath.    Cardiovascular:  Negative for chest pain and palpitations.   Gastrointestinal:  Negative for abdominal pain, constipation, diarrhea and vomiting.   Genitourinary:  Negative for difficulty urinating.   Skin:  Negative for rash.   Neurological:  Negative for headaches.   Psychiatric/Behavioral:  Negative for dysphoric mood. The patient is nervous/anxious.        Objective:    /56   Pulse 92   Temp (!) 96.8 °F (36 °C)   Resp 17   Ht 5' 3\" (1.6 m)   Wt 101 kg (222 lb 6.4 oz)   BMI 39.40 kg/m²      Physical Exam  Vitals and nursing note reviewed.   Constitutional:       General: She is not in acute distress.     Appearance: She is well-developed. She is obese.   HENT:      Head: Normocephalic and atraumatic.   Eyes:      Conjunctiva/sclera: Conjunctivae normal.   Neck:      Thyroid: No thyromegaly.   Pulmonary:      Effort: Pulmonary effort is normal. No " respiratory distress.   Skin:     Findings: No rash (visible).   Neurological:      Mental Status: She is alert and oriented to person, place, and time.   Psychiatric:         Mood and Affect: Mood normal.         Behavior: Behavior normal.

## 2024-05-22 NOTE — ASSESSMENT & PLAN NOTE
-Patient is pursuing Conservative Program with RD meal planning  -Initial weight loss goal of 5-10% weight loss for improved health  -Screening labs  up to date    Initial:240lb  Last Visit:224  Current:222.4  Change:-17.6lb    Goals:  -Recommend to continue to food log (ie.) [email or web address] caloreis  - To continue to drink at least 64oz of water daily.No sugary beverages.  -continue to incorporate exercise    She will start on wegovy now that supply has improved. Discussed possible side effects with medication  Contraception: IUD  Medication contract signed prior  Past medication: naltrexone (nausea/vomiting)

## 2024-05-23 ENCOUNTER — APPOINTMENT (OUTPATIENT)
Dept: LAB | Facility: HOSPITAL | Age: 39
End: 2024-05-23
Payer: COMMERCIAL

## 2024-05-23 DIAGNOSIS — Z13.0 SCREENING, ANEMIA, DEFICIENCY, IRON: ICD-10-CM

## 2024-05-23 DIAGNOSIS — E56.9 VITAMIN DEFICIENCY: ICD-10-CM

## 2024-05-23 DIAGNOSIS — G51.4 FACIAL TWITCHING: ICD-10-CM

## 2024-05-23 LAB
25(OH)D3 SERPL-MCNC: 22.8 NG/ML (ref 30–100)
ALBUMIN SERPL BCP-MCNC: 4.1 G/DL (ref 3.5–5)
ALP SERPL-CCNC: 68 U/L (ref 34–104)
ALT SERPL W P-5'-P-CCNC: 25 U/L (ref 7–52)
ANION GAP SERPL CALCULATED.3IONS-SCNC: 9 MMOL/L (ref 4–13)
AST SERPL W P-5'-P-CCNC: 18 U/L (ref 13–39)
BASOPHILS # BLD AUTO: 0.04 THOUSANDS/ÂΜL (ref 0–0.1)
BASOPHILS NFR BLD AUTO: 1 % (ref 0–1)
BILIRUB SERPL-MCNC: 0.35 MG/DL (ref 0.2–1)
BUN SERPL-MCNC: 19 MG/DL (ref 5–25)
CALCIUM SERPL-MCNC: 9.3 MG/DL (ref 8.4–10.2)
CHLORIDE SERPL-SCNC: 106 MMOL/L (ref 96–108)
CO2 SERPL-SCNC: 23 MMOL/L (ref 21–32)
CREAT SERPL-MCNC: 0.86 MG/DL (ref 0.6–1.3)
EOSINOPHIL # BLD AUTO: 0.1 THOUSAND/ÂΜL (ref 0–0.61)
EOSINOPHIL NFR BLD AUTO: 2 % (ref 0–6)
ERYTHROCYTE [DISTWIDTH] IN BLOOD BY AUTOMATED COUNT: 15.1 % (ref 11.6–15.1)
GFR SERPL CREATININE-BSD FRML MDRD: 86 ML/MIN/1.73SQ M
GLUCOSE P FAST SERPL-MCNC: 98 MG/DL (ref 65–99)
HCT VFR BLD AUTO: 34.6 % (ref 34.8–46.1)
HGB BLD-MCNC: 10.7 G/DL (ref 11.5–15.4)
IMM GRANULOCYTES # BLD AUTO: 0.02 THOUSAND/UL (ref 0–0.2)
IMM GRANULOCYTES NFR BLD AUTO: 0 % (ref 0–2)
IRON SATN MFR SERPL: 17 % (ref 15–50)
IRON SERPL-MCNC: 62 UG/DL (ref 50–212)
LYMPHOCYTES # BLD AUTO: 2.43 THOUSANDS/ÂΜL (ref 0.6–4.47)
LYMPHOCYTES NFR BLD AUTO: 36 % (ref 14–44)
MAGNESIUM SERPL-MCNC: 2 MG/DL (ref 1.9–2.7)
MCH RBC QN AUTO: 23.5 PG (ref 26.8–34.3)
MCHC RBC AUTO-ENTMCNC: 30.9 G/DL (ref 31.4–37.4)
MCV RBC AUTO: 76 FL (ref 82–98)
MONOCYTES # BLD AUTO: 0.47 THOUSAND/ÂΜL (ref 0.17–1.22)
MONOCYTES NFR BLD AUTO: 7 % (ref 4–12)
NEUTROPHILS # BLD AUTO: 3.71 THOUSANDS/ÂΜL (ref 1.85–7.62)
NEUTS SEG NFR BLD AUTO: 54 % (ref 43–75)
NRBC BLD AUTO-RTO: 0 /100 WBCS
PLATELET # BLD AUTO: 267 THOUSANDS/UL (ref 149–390)
PMV BLD AUTO: 9.3 FL (ref 8.9–12.7)
POTASSIUM SERPL-SCNC: 4.1 MMOL/L (ref 3.5–5.3)
PROT SERPL-MCNC: 6.9 G/DL (ref 6.4–8.4)
RBC # BLD AUTO: 4.55 MILLION/UL (ref 3.81–5.12)
SODIUM SERPL-SCNC: 138 MMOL/L (ref 135–147)
T4 FREE SERPL-MCNC: 0.6 NG/DL (ref 0.61–1.12)
TIBC SERPL-MCNC: 361 UG/DL (ref 250–450)
TSH SERPL DL<=0.05 MIU/L-ACNC: 2.56 UIU/ML (ref 0.45–4.5)
UIBC SERPL-MCNC: 299 UG/DL (ref 155–355)
VIT B12 SERPL-MCNC: 375 PG/ML (ref 180–914)
WBC # BLD AUTO: 6.77 THOUSAND/UL (ref 4.31–10.16)

## 2024-05-23 PROCEDURE — 84443 ASSAY THYROID STIM HORMONE: CPT

## 2024-05-23 PROCEDURE — 36415 COLL VENOUS BLD VENIPUNCTURE: CPT

## 2024-05-23 PROCEDURE — 84439 ASSAY OF FREE THYROXINE: CPT

## 2024-05-23 PROCEDURE — 83540 ASSAY OF IRON: CPT

## 2024-05-23 PROCEDURE — 85025 COMPLETE CBC W/AUTO DIFF WBC: CPT

## 2024-05-23 PROCEDURE — 82607 VITAMIN B-12: CPT

## 2024-05-23 PROCEDURE — 83550 IRON BINDING TEST: CPT

## 2024-05-23 PROCEDURE — 82306 VITAMIN D 25 HYDROXY: CPT

## 2024-05-23 PROCEDURE — 80053 COMPREHEN METABOLIC PANEL: CPT

## 2024-05-23 PROCEDURE — 83735 ASSAY OF MAGNESIUM: CPT

## 2024-06-04 ENCOUNTER — HOSPITAL ENCOUNTER (OUTPATIENT)
Dept: NON INVASIVE DIAGNOSTICS | Facility: HOSPITAL | Age: 39
Discharge: HOME | End: 2024-06-04
Payer: COMMERCIAL

## 2024-06-04 DIAGNOSIS — R00.2 PALPITATIONS: ICD-10-CM

## 2024-06-04 PROCEDURE — 93225 XTRNL ECG REC<48 HRS REC: CPT

## 2024-06-04 PROCEDURE — 93226 XTRNL ECG REC<48 HR SCAN A/R: CPT

## 2024-06-07 DIAGNOSIS — E66.9 CLASS II OBESITY: Primary | ICD-10-CM

## 2024-06-07 PROCEDURE — 93227 XTRNL ECG REC<48 HR R&I: CPT | Performed by: INTERNAL MEDICINE

## 2024-06-20 ENCOUNTER — CLINICAL SUPPORT (OUTPATIENT)
Dept: BARIATRICS | Facility: CLINIC | Age: 39
End: 2024-06-20

## 2024-06-20 VITALS
DIASTOLIC BLOOD PRESSURE: 62 MMHG | WEIGHT: 219.8 LBS | BODY MASS INDEX: 38.95 KG/M2 | HEART RATE: 100 BPM | SYSTOLIC BLOOD PRESSURE: 124 MMHG | RESPIRATION RATE: 16 BRPM | HEIGHT: 63 IN

## 2024-06-20 DIAGNOSIS — E66.9 OBESITY, CLASS II, BMI 35-39.9: Primary | ICD-10-CM

## 2024-06-20 DIAGNOSIS — R63.5 ABNORMAL WEIGHT GAIN: Primary | ICD-10-CM

## 2024-06-20 PROCEDURE — RECHECK

## 2024-06-20 NOTE — PROGRESS NOTES
Patient last visit weight: 224lb  Patient current visit weight:219.8lb    If you are taking phentermine or other oral weight loss medications, are you experiencing any of the following symptoms:  Headache:   Blurred Vision:   Chest Pain:   Palpitations:  Insomnia:   SPECIFY ORAL MEDICATION AND DOSAGE:     If you are taking an injectable medication,  are you experiencing any of the following symptoms:  Bloating: NO  Nausea:NO  Vomiting: NO  Constipation: NO  Diarrhea:NO  SPECIFY INJECTABLE MEDICATION AND CURRENT DOSAGE: WEGOVY      Vitals:    Is BP less than 100/60?NO  Is BP greater than 140/90?NO  Is HR greater than 100?NO  **If yes to any of the above, have patient relax and repeat in 5-10 minutes**    Repeat values:    Is BP less than 100/60?  Is BP greater than 140/90?  Is HR greater than 100?  **If values remain outside of ranges above, please consult provider for next steps**

## 2024-07-05 DIAGNOSIS — E66.9 CLASS II OBESITY: Primary | ICD-10-CM

## 2024-07-23 ENCOUNTER — CLINICAL SUPPORT (OUTPATIENT)
Dept: BARIATRICS | Facility: CLINIC | Age: 39
End: 2024-07-23

## 2024-07-23 VITALS
DIASTOLIC BLOOD PRESSURE: 68 MMHG | RESPIRATION RATE: 16 BRPM | WEIGHT: 217.2 LBS | HEART RATE: 96 BPM | SYSTOLIC BLOOD PRESSURE: 122 MMHG | TEMPERATURE: 97 F | HEIGHT: 63 IN | BODY MASS INDEX: 38.48 KG/M2

## 2024-07-23 DIAGNOSIS — R63.5 ABNORMAL WEIGHT GAIN: Primary | ICD-10-CM

## 2024-07-23 PROCEDURE — RECHECK

## 2024-07-23 RX ORDER — SEMAGLUTIDE 1 MG/.5ML
1 INJECTION, SOLUTION SUBCUTANEOUS WEEKLY
COMMUNITY

## 2024-07-23 NOTE — PROGRESS NOTES
Patient last visit weight:219lb 12.8oz  Patient current visit weight: 217.2lb    If you are taking phentermine or other oral weight loss medications, are you experiencing any of the following symptoms:  Headache:   Blurred Vision:   Chest Pain:   Palpitations:  Insomnia:   SPECIFY ORAL MEDICATION AND DOSAGE:     If you are taking an injectable medication,  are you experiencing any of the following symptoms:  Bloating: NO  Nausea:NO  Vomiting: NO  Constipation: NO  Diarrhea:NO  SPECIFY INJECTABLE MEDICATION AND CURRENT DOSAGE: WEGOVY 1MG      Vitals:    Is BP less than 100/60?NO  Is BP greater than 140/90?NO  Is HR greater than 100?NO  **If yes to any of the above, have patient relax and repeat in 5-10 minutes**    Repeat values:    Is BP less than 100/60?  Is BP greater than 140/90?  Is HR greater than 100?  **If values remain outside of ranges above, please consult provider for next steps**

## 2024-08-05 DIAGNOSIS — E66.9 CLASS II OBESITY: Primary | ICD-10-CM

## 2024-08-06 ENCOUNTER — OFFICE VISIT (OUTPATIENT)
Dept: FAMILY MEDICINE CLINIC | Facility: CLINIC | Age: 39
End: 2024-08-06
Payer: COMMERCIAL

## 2024-08-06 VITALS
SYSTOLIC BLOOD PRESSURE: 134 MMHG | TEMPERATURE: 97.8 F | DIASTOLIC BLOOD PRESSURE: 76 MMHG | WEIGHT: 216.8 LBS | BODY MASS INDEX: 38.41 KG/M2 | OXYGEN SATURATION: 98 % | HEART RATE: 96 BPM | HEIGHT: 63 IN

## 2024-08-06 DIAGNOSIS — D64.9 LOW HEMOGLOBIN: ICD-10-CM

## 2024-08-06 DIAGNOSIS — E55.9 VITAMIN D DEFICIENCY: ICD-10-CM

## 2024-08-06 DIAGNOSIS — F41.9 ANXIETY: ICD-10-CM

## 2024-08-06 DIAGNOSIS — R25.3 EYE MUSCLE TWITCHES: ICD-10-CM

## 2024-08-06 DIAGNOSIS — Z00.00 ANNUAL PHYSICAL EXAM: Primary | ICD-10-CM

## 2024-08-06 DIAGNOSIS — E78.5 HYPERLIPIDEMIA, UNSPECIFIED HYPERLIPIDEMIA TYPE: ICD-10-CM

## 2024-08-06 DIAGNOSIS — E04.9 ENLARGED THYROID: ICD-10-CM

## 2024-08-06 DIAGNOSIS — Z13.29 SCREENING FOR THYROID DISORDER: ICD-10-CM

## 2024-08-06 PROCEDURE — 99395 PREV VISIT EST AGE 18-39: CPT | Performed by: NURSE PRACTITIONER

## 2024-08-06 RX ORDER — BUPROPION HYDROCHLORIDE 100 MG/1
100 TABLET, EXTENDED RELEASE ORAL 2 TIMES DAILY
Qty: 180 TABLET | Refills: 3 | Status: SHIPPED | OUTPATIENT
Start: 2024-08-06

## 2024-08-06 NOTE — ASSESSMENT & PLAN NOTE
Last lipid panel   Total 226;   Working on healthy lifestyle change  We will recheck lipid panel after the new year

## 2024-08-06 NOTE — ASSESSMENT & PLAN NOTE
"Reports feeling better from an anxiety standpoint since starting Wellbutrin  But still with persistent eye twitching  Occurs at random times-but is occurring daily:  both eyes  Reports feeling like her left upper lid is \"heavy\" at times  Denies any visual change; no blurriness; no double vision  Taking breaks from her computer screen; wearing blue glasses; uses eye lubricant  Has seen her optometrist-reports no abnormal findings (no dry eye)   She will discuss with her rheumatologist  Will also refer to ophthalmologist today  "

## 2024-08-06 NOTE — PROGRESS NOTES
"Adult Annual Physical  Name: Summer Colbert      : 1985      MRN: [id number]  Encounter Provider: INA Kahn  Encounter Date: 2024   Encounter department: [facility]    Assessment & Plan   1. Annual physical exam  2. Vitamin D deficiency  Assessment & Plan:    Recheck vitamin D now  Orders:  -     Vitamin D 25 hydroxy; Future  3. Eye muscle twitches  Assessment & Plan:  Reports feeling better from an anxiety standpoint since starting Wellbutrin  But still with persistent eye twitching  Occurs at random times-but is occurring daily:  both eyes  Reports feeling like her left upper lid is \"heavy\" at times  Denies any visual change; no blurriness; no double vision  Taking breaks from her computer screen; wearing blue glasses; uses eye lubricant  Has seen her optometrist-reports no abnormal findings (no dry eye)   She will discuss with her rheumatologist  Will also refer to ophthalmologist today  Orders:  -     Ambulatory Referral to Ophthalmology; Future  4. Enlarged thyroid  -     TSH, 3rd generation; Future  -     T4, free; Future  5. Screening for thyroid disorder  -     TSH, 3rd generation; Future  -     T4, free; Future  6. Low hemoglobin  Comments:  Noted on recent labs  Pr reports donating blood just prior to getting labs drawn  Recheck CBC  Orders:  -     CBC and differential; Future  7. Hyperlipidemia, unspecified hyperlipidemia type  Assessment & Plan:  Last lipid panel   Total 226;   Working on healthy lifestyle change  We will recheck lipid panel after the new year  8. Anxiety  Assessment & Plan:  Reports symptoms well-controlled with Wellbutrin 100 SR twice daily  No depression-denies SI/HI  Feels better able to manage daily stressors  [place]  Will continue current treatment at this time  Follow-up 6 months for symptom reassessment    ER precaution for any acute mental health change  Orders:  -     " buPROPion (Wellbutrin SR) 100 mg 12 hr tablet; Take 1 tablet (100 mg total) by mouth 2 (two) times a day    Immunizations and preventive care screenings were discussed with patient today. Appropriate education was printed on patient's after visit summary.    Counseling:  Alcohol/drug use: discussed moderation in alcohol intake, the recommendations for healthy alcohol use, and avoidance of illicit drug use.  Dental Health: discussed importance of regular tooth brushing, flossing, and dental visits.  Injury prevention: discussed safety/seat belts, safety helmets, smoke detectors, carbon dioxide detectors, and smoking near bedding or upholstery.  Sexual health: discussed sexually transmitted diseases, partner selection, use of condoms, avoidance of unintended pregnancy, and contraceptive alternatives.  Exercise: the importance of regular exercise/physical activity was discussed. Recommend exercise 3-5 times per week for at least 30 minutes.          History of Present Illness     Adult Annual Physical:  Patient presents for annual physical. Age-appropriate preventative care and recommended screenings reviewed  Fasting lab work orders from May reviewed   Repeat lab work ordered.     Diet and Physical Activity:  - Diet/Nutrition: well balanced diet. Has been working on healthy diet; following with weight management; Wegovy  - Exercise: no formal exercise, walking and 3-4 times a week on average.    General Health:  - Sleep: sleeps well.  - Hearing: normal hearing bilateral ears.  - Vision: no vision problems and goes for regular eye exams.  - Dental: regular dental visits.    /GYN Health:  - Follows with GYN: yes.   - Menopause: premenopausal.   - History of STDs: no  - Contraception: IUD placement. PAP every other year per choice; due now; encourage self breast exams; encoruage daily vitamins        Review of Systems   Constitutional: Negative.    HENT: Negative.     Eyes: Negative.    Respiratory: Negative.    "  Cardiovascular: Negative.    Gastrointestinal: Negative.    Genitourinary: Negative.    Musculoskeletal: Negative.    Skin: Negative.    Neurological: Negative.    Psychiatric/Behavioral: Negative.           Objective     /76 (BP Location: Left arm, Patient Position: Sitting, Cuff Size: Adult)   Pulse 96   Temp 97.8 °F (36.6 °C)   Ht 5' 3\" (1.6 m)   Wt 98.3 kg (216 lb 12.8 oz)   SpO2 98%   BMI 38.40 kg/m²     Physical Exam  Vitals and nursing note reviewed.   Constitutional:       General: She is not in acute distress.     Appearance: Normal appearance. She is well-developed and well-groomed. She is not ill-appearing.   HENT:      Head: Normocephalic.      Right Ear: Tympanic membrane, ear canal and external ear normal.      Left Ear: Tympanic membrane, ear canal and external ear normal.      Nose: Nose normal.      Mouth/Throat:      Mouth: Mucous membranes are moist.      Pharynx: Oropharynx is clear.   Eyes:      Conjunctiva/sclera: Conjunctivae normal.      Pupils: Pupils are equal, round, and reactive to light.   Neck:      Thyroid: No thyromegaly.      Vascular: No carotid bruit.   Cardiovascular:      Rate and Rhythm: Normal rate and regular rhythm.      Pulses:           Posterior tibial pulses are 2+ on the right side and 2+ on the left side.      Heart sounds: Normal heart sounds.   Pulmonary:      Effort: Pulmonary effort is normal. No respiratory distress.      Breath sounds: Normal breath sounds and air entry.   Musculoskeletal:      Right lower leg: No edema.      Left lower leg: No edema.   Lymphadenopathy:      Cervical: No cervical adenopathy.   Skin:     General: Skin is warm and dry.   Neurological:      General: No focal deficit present.      Mental Status: She is alert and oriented to person, place, and time.   Psychiatric:         Attention and Perception: Attention normal.         Mood and Affect: Mood normal.         Behavior: Behavior normal.         Thought Content: Thought " content normal.         Judgment: Judgment normal.

## 2024-08-06 NOTE — ASSESSMENT & PLAN NOTE
Reports symptoms well-controlled with Wellbutrin 100 SR twice daily  No depression-denies SI/HI  Feels better able to manage daily stressors  [place]  Will continue current treatment at this time  Follow-up 6 months for symptom reassessment    ER precaution for any acute mental health change

## 2024-08-15 ENCOUNTER — TELEPHONE (OUTPATIENT)
Age: 39
End: 2024-08-15

## 2024-08-15 NOTE — TELEPHONE ENCOUNTER
Pt called to reschedule her nurse visit on 8/27.  I transferred her to Jacquelyn in  office to help her.

## 2024-08-29 ENCOUNTER — CLINICAL SUPPORT (OUTPATIENT)
Dept: BARIATRICS | Facility: CLINIC | Age: 39
End: 2024-08-29

## 2024-08-29 VITALS
TEMPERATURE: 98.1 F | SYSTOLIC BLOOD PRESSURE: 110 MMHG | BODY MASS INDEX: 37.56 KG/M2 | WEIGHT: 212 LBS | DIASTOLIC BLOOD PRESSURE: 72 MMHG | HEART RATE: 91 BPM | OXYGEN SATURATION: 99 % | RESPIRATION RATE: 17 BRPM | HEIGHT: 63 IN

## 2024-08-29 DIAGNOSIS — R63.5 ABNORMAL WEIGHT GAIN: Primary | ICD-10-CM

## 2024-08-29 PROCEDURE — RECHECK

## 2024-08-29 NOTE — PROGRESS NOTES
Patient last visit weight:222 lb  Patient current visit weight: 212 lb    If you are taking phentermine or other oral weight loss medications, are you experiencing any of the following symptoms:  Headache:   Blurred Vision:   Chest Pain:   Palpitations:  Insomnia:   SPECIFY ORAL MEDICATION AND DOSAGE:     If you are taking an injectable medication,  are you experiencing any of the following symptoms:  Bloating:  No  Nausea: No  Vomiting:  No  Constipation:  No  Diarrhea: No  SPECIFY INJECTABLE MEDICATION AND CURRENT DOSAGE: Wegovy 2.4 mg      Vitals:    Is BP less than 100/60? No  Is BP greater than 140/90? No  Is HR greater than 100? No  **If yes to any of the above, have patient relax and repeat in 5-10 minutes**    Repeat values:    Is BP less than 100/60?  Is BP greater than 140/90?  Is HR greater than 100?  **If values remain outside of ranges above, please consult provider for next steps**

## 2024-09-19 ENCOUNTER — TELEPHONE (OUTPATIENT)
Dept: BARIATRICS | Facility: CLINIC | Age: 39
End: 2024-09-19

## 2024-09-19 NOTE — TELEPHONE ENCOUNTER
PA for Wegovy 2.4 mg APPROVED     Date(s) approved 9/19/2024-9/19/2025    Case #    Patient advised by          [x]MyChart Message  []Phone call   []LMOM  []L/M to call office as no active Communication consent on file  []Unable to leave detailed message as VM not approved on Communication consent       Pharmacy advised by    [x]Fax  []Phone call    Approval letter scanned into [employer] Yes

## 2024-09-19 NOTE — TELEPHONE ENCOUNTER
PA for Wegovy 2.4 mg SUBMITTED     via    [x]CMM-KEY: [id number]  [][employer]-Case ID #    []Faxed to plan   []Other website    []Phone call Case ID #      Office notes sent, clinical questions answered. Awaiting determination    Turnaround time for your insurance to make a decision on your Prior Authorization can take 7-21 business days.

## 2024-09-24 ENCOUNTER — CLINICAL SUPPORT (OUTPATIENT)
Dept: BARIATRICS | Facility: CLINIC | Age: 39
End: 2024-09-24

## 2024-09-24 VITALS
SYSTOLIC BLOOD PRESSURE: 118 MMHG | RESPIRATION RATE: 17 BRPM | OXYGEN SATURATION: 98 % | HEIGHT: 63 IN | WEIGHT: 211 LBS | DIASTOLIC BLOOD PRESSURE: 76 MMHG | HEART RATE: 92 BPM | BODY MASS INDEX: 37.39 KG/M2 | TEMPERATURE: 98.1 F

## 2024-09-24 DIAGNOSIS — R63.5 ABNORMAL WEIGHT GAIN: Primary | ICD-10-CM

## 2024-09-24 PROCEDURE — RECHECK

## 2024-09-24 NOTE — PROGRESS NOTES
Patient last visit weight:  Patient current visit weight:    If you are taking phentermine or other oral weight loss medications, are you experiencing any of the following symptoms:  Headache:   Blurred Vision:   Chest Pain:   Palpitations:  Insomnia:   SPECIFY ORAL MEDICATION AND DOSAGE:     If you are taking an injectable medication,  are you experiencing any of the following symptoms:  Bloating: NO  Nausea:NO  Vomiting: NO  Constipation: NO  Diarrhea:NO  SPECIFY INJECTABLE MEDICATION AND CURRENT DOSAGE: Wegovy 2.4 mg     Vitals:    Is BP less than 100/60? NO  Is BP greater than 140/90? NO  Is HR greater than 100?NO  **If yes to any of the above, have patient relax and repeat in 5-10 minutes**    Repeat values:    Is BP less than 100/60?  Is BP greater than 140/90?  Is HR greater than 100?  **If values remain outside of ranges above, please consult provider for next steps**

## 2024-10-31 ENCOUNTER — OFFICE VISIT (OUTPATIENT)
Dept: BARIATRICS | Facility: CLINIC | Age: 39
End: 2024-10-31
Payer: COMMERCIAL

## 2024-10-31 VITALS
HEART RATE: 99 BPM | TEMPERATURE: 98.2 F | RESPIRATION RATE: 17 BRPM | WEIGHT: 206.6 LBS | SYSTOLIC BLOOD PRESSURE: 122 MMHG | DIASTOLIC BLOOD PRESSURE: 74 MMHG | HEIGHT: 63 IN | BODY MASS INDEX: 36.61 KG/M2

## 2024-10-31 DIAGNOSIS — E66.812 CLASS II OBESITY: Primary | ICD-10-CM

## 2024-10-31 DIAGNOSIS — E78.5 HYPERLIPIDEMIA, UNSPECIFIED HYPERLIPIDEMIA TYPE: ICD-10-CM

## 2024-10-31 PROCEDURE — 99214 OFFICE O/P EST MOD 30 MIN: CPT | Performed by: PHYSICIAN ASSISTANT

## 2024-10-31 NOTE — PROGRESS NOTES
Assessment/Plan:    Class II obesity  -Patient is pursuing Conservative Program with RD meal planning  -Initial weight loss goal of 5-10% weight loss for improved health  -Screening labs  up to date    Initial:240lb  Last Visit:222.4  Current:206.6  Change:-33.4lb (-15.8lb from last visit)    Goals:  -Recommend to continue to food log (ie.) [email or web address],loseit.[email or web address]-1500 caloreis  - To continue to drink at least 64oz of water daily.No sugary beverages.  -continue to increase steps and would try to add strength training at least 1 x week.     To continue on wegovy. Start weight 222.4. 8 % weight loss . Tolerating well   Contraception: IUD  Medication contract signed prior  Past medication: naltrexone (nausea/vomiting)      Hyperlipidemia  -should improve with weight loss, dietary, and lifestyle changes        Return in about 6 months (around 4/30/2025) for weight check monthly for accountability.       Diagnoses and all orders for this visit:    Class II obesity  -     Semaglutide-Weight Management (WEGOVY) 2.4 MG/0.75ML; Inject 0.75 mL (2.4 mg total) under the skin once a week    Hyperlipidemia, unspecified hyperlipidemia type          Subjective:   Chief Complaint   Patient presents with    Follow-up     MWM-5M F/u; Waist-38.5in        Patient ID: Summer Colbert  is a 39 y.o. female with excess weight/obesity here to pursue weight managment.  Patient is pursuing Conservative Program.     HPI  On 2.4mg of wegovy. She has had some constipation but is taking benefiber.  Appetite is conrolled with the medication.  She has had decreased urge for foods and does not think she is getting as much protein.    Wt Readings from Last 10 Encounters:   10/31/24 93.7 kg (206 lb 9.6 oz)   09/24/24 95.7 kg (211 lb)   08/29/24 96.2 kg (212 lb)   08/06/24 98.3 kg (216 lb 12.8 oz)   07/23/24 98.5 kg (217 lb 3.2 oz)   06/20/24 99.7 kg (219 lb 12.8 oz)   05/22/24 101 kg (222 lb 6.4 oz)   05/20/24 101 kg (223 lb  3.2 oz)   24 102 kg (224 lb)   24 102 kg (224 lb)       Food logging: kaypal 1500 calories , 75 gm protein a day  Increased appetite/cravings:  Exercise:doing step challenge to increase activity (min. 7500 steps )  Hydration:water, coffee w/protein, diet pepsi sometimes     Diet Recall:  B: egg and 2 pieces of turkey back  L: chicken and vegetable  D: protein, vegetable , starch    The following portions of the patient's history were reviewed and updated as appropriate: She  has no past medical history on file.  She   Patient Active Problem List    Diagnosis Date Noted    Enlarged thyroid 2024    Eye muscle twitches 2024    Excessive daytime sleepiness 2024    Insomnia 2024    Skin lesion 2024    Positive TONG (antinuclear antibody) 2023    Hirsutism 2023    Vitamin D deficiency 2023    Sleep apnea-like behavior 2023    Arthralgia 2023    Hyperlipidemia 10/18/2022    Other fatigue 10/18/2022    Class II obesity 10/18/2022    Anxiety 10/18/2022    Encounter for surveillance of abnormal nevi 10/18/2022    IUD (intrauterine device) in place 2022     She  has a past surgical history that includes  section, low transverse.  Her family history includes Heart failure in her maternal grandfather; Hyperlipidemia in her brother, father, and mother; Hypertension in her father and mother; Obesity in her mother; Osteoporosis in her maternal grandmother and mother; Stroke in her maternal grandfather; Thyroid disease unspecified in her mother; Transient ischemic attack in her mother.  She  reports that she quit smoking about 15 years ago. Her smoking use included cigarettes. She started smoking about 19 years ago. She has a 1 pack-year smoking history. She has never used smokeless tobacco. She reports that she does not currently use alcohol. She reports that she does not use drugs.  Current Outpatient Medications   Medication Sig Dispense  "Refill    buPROPion (Wellbutrin SR) 100 mg 12 hr tablet Take 1 tablet (100 mg total) by mouth 2 (two) times a day 180 tablet 3    Levonorgestrel (MIRENA) 20 MCG/DAY IUD 1 each by Intrauterine route once       Multiple Vitamin (MULTIVITAMIN ADULT PO) Take by mouth      Semaglutide-Weight Management (WEGOVY) 2.4 MG/0.75ML Inject 0.75 mL (2.4 mg total) under the skin once a week 3 mL 5     No current facility-administered medications for this visit.     Current Outpatient Medications on File Prior to Visit   Medication Sig    buPROPion (Wellbutrin SR) 100 mg 12 hr tablet Take 1 tablet (100 mg total) by mouth 2 (two) times a day    Levonorgestrel (MIRENA) 20 MCG/DAY IUD 1 each by Intrauterine route once     Multiple Vitamin (MULTIVITAMIN ADULT PO) Take by mouth     No current facility-administered medications on file prior to visit.     She is allergic to sulfa antibiotics..    Review of Systems   Constitutional:  Negative for fever.   Respiratory:  Negative for shortness of breath.    Cardiovascular:  Negative for chest pain and palpitations.   Gastrointestinal:  Negative for abdominal pain, constipation, diarrhea and vomiting.   Genitourinary:  Negative for difficulty urinating.   Skin:  Negative for rash.   Neurological:  Negative for headaches.   Psychiatric/Behavioral:  Negative for dysphoric mood. The patient is not nervous/anxious.        Objective:    /74   Pulse 99   Temp 98.2 °F (36.8 °C)   Resp 17   Ht 5' 3\" (1.6 m)   Wt 93.7 kg (206 lb 9.6 oz)   BMI 36.60 kg/m²      Physical Exam  Vitals and nursing note reviewed.   Constitutional:       General: She is not in acute distress.     Appearance: She is well-developed. She is obese.   HENT:      Head: Normocephalic and atraumatic.   Eyes:      Conjunctiva/sclera: Conjunctivae normal.   Neck:      Thyroid: No thyromegaly.   Pulmonary:      Effort: Pulmonary effort is normal. No respiratory distress.   Skin:     Findings: No rash (visible). "   Neurological:      Mental Status: She is alert and oriented to person, place, and time.   Psychiatric:         Behavior: Behavior normal.

## 2024-10-31 NOTE — ASSESSMENT & PLAN NOTE
-Patient is pursuing Conservative Program with RD meal planning  -Initial weight loss goal of 5-10% weight loss for improved health  -Screening labs  up to date    Initial:240lb  Last Visit:222.4  Current:206.6  Change:-33.4lb (-15.8lb from last visit)    Goals:  -Recommend to continue to food log (ie.) [email or web address],[email or web address]-1500 caloreis  - To continue to drink at least 64oz of water daily.No sugary beverages.  -continue to increase steps and would try to add strength training at least 1 x week.     To continue on wegovy. Start weight 222.4. 8 % weight loss . Tolerating well   Contraception: IUD  Medication contract signed prior  Past medication: naltrexone (nausea/vomiting)

## 2024-11-11 ENCOUNTER — APPOINTMENT (OUTPATIENT)
Dept: LAB | Facility: HOSPITAL | Age: 39
End: 2024-11-11
Payer: COMMERCIAL

## 2024-11-11 DIAGNOSIS — D64.9 LOW HEMOGLOBIN: ICD-10-CM

## 2024-11-11 DIAGNOSIS — E04.9 ENLARGED THYROID: ICD-10-CM

## 2024-11-11 DIAGNOSIS — Z13.29 SCREENING FOR THYROID DISORDER: ICD-10-CM

## 2024-11-11 DIAGNOSIS — E55.9 VITAMIN D DEFICIENCY: ICD-10-CM

## 2024-11-11 LAB
25(OH)D3 SERPL-MCNC: 27.7 NG/ML (ref 30–100)
BASOPHILS # BLD AUTO: 0.05 THOUSANDS/ÂΜL (ref 0–0.1)
BASOPHILS NFR BLD AUTO: 1 % (ref 0–1)
EOSINOPHIL # BLD AUTO: 0.09 THOUSAND/ÂΜL (ref 0–0.61)
EOSINOPHIL NFR BLD AUTO: 1 % (ref 0–6)
ERYTHROCYTE [DISTWIDTH] IN BLOOD BY AUTOMATED COUNT: 15.6 % (ref 11.6–15.1)
HCT VFR BLD AUTO: 39.2 % (ref 34.8–46.1)
HGB BLD-MCNC: 12 G/DL (ref 11.5–15.4)
IMM GRANULOCYTES # BLD AUTO: 0.02 THOUSAND/UL (ref 0–0.2)
IMM GRANULOCYTES NFR BLD AUTO: 0 % (ref 0–2)
LYMPHOCYTES # BLD AUTO: 2.56 THOUSANDS/ÂΜL (ref 0.6–4.47)
LYMPHOCYTES NFR BLD AUTO: 35 % (ref 14–44)
MCH RBC QN AUTO: 23.8 PG (ref 26.8–34.3)
MCHC RBC AUTO-ENTMCNC: 30.6 G/DL (ref 31.4–37.4)
MCV RBC AUTO: 78 FL (ref 82–98)
MONOCYTES # BLD AUTO: 0.48 THOUSAND/ÂΜL (ref 0.17–1.22)
MONOCYTES NFR BLD AUTO: 7 % (ref 4–12)
NEUTROPHILS # BLD AUTO: 4.11 THOUSANDS/ÂΜL (ref 1.85–7.62)
NEUTS SEG NFR BLD AUTO: 56 % (ref 43–75)
NRBC BLD AUTO-RTO: 0 /100 WBCS
PLATELET # BLD AUTO: 264 THOUSANDS/UL (ref 149–390)
PMV BLD AUTO: 9.1 FL (ref 8.9–12.7)
RBC # BLD AUTO: 5.04 MILLION/UL (ref 3.81–5.12)
T4 FREE SERPL-MCNC: 0.91 NG/DL (ref 0.61–1.12)
TSH SERPL DL<=0.05 MIU/L-ACNC: 1.74 UIU/ML (ref 0.45–4.5)
WBC # BLD AUTO: 7.31 THOUSAND/UL (ref 4.31–10.16)

## 2024-11-11 PROCEDURE — 82306 VITAMIN D 25 HYDROXY: CPT

## 2024-11-11 PROCEDURE — 36415 COLL VENOUS BLD VENIPUNCTURE: CPT

## 2024-11-11 PROCEDURE — 84439 ASSAY OF FREE THYROXINE: CPT

## 2024-11-11 PROCEDURE — 85025 COMPLETE CBC W/AUTO DIFF WBC: CPT

## 2024-11-11 PROCEDURE — 84443 ASSAY THYROID STIM HORMONE: CPT

## 2024-11-14 ENCOUNTER — RESULTS FOLLOW-UP (OUTPATIENT)
Dept: FAMILY MEDICINE CLINIC | Facility: CLINIC | Age: 39
End: 2024-11-14

## 2024-11-29 ENCOUNTER — CLINICAL SUPPORT (OUTPATIENT)
Dept: BARIATRICS | Facility: CLINIC | Age: 39
End: 2024-11-29

## 2024-11-29 VITALS
WEIGHT: 203.2 LBS | HEIGHT: 64 IN | TEMPERATURE: 97.5 F | OXYGEN SATURATION: 98 % | SYSTOLIC BLOOD PRESSURE: 116 MMHG | DIASTOLIC BLOOD PRESSURE: 62 MMHG | BODY MASS INDEX: 34.69 KG/M2 | HEART RATE: 98 BPM

## 2024-11-29 DIAGNOSIS — R63.5 ABNORMAL WEIGHT GAIN: Primary | ICD-10-CM

## 2024-11-29 PROCEDURE — RECHECK

## 2024-11-29 NOTE — PROGRESS NOTES
Patient last visit weight: 206.6 lb   Patient current visit weight: 203.2 lb     If you are taking phentermine or other oral weight loss medications, are you experiencing any of the following symptoms:  Headache:   Blurred Vision:   Chest Pain:   Palpitations:  Insomnia:   SPECIFY ORAL MEDICATION AND DOSAGE:     If you are taking an injectable medication,  are you experiencing any of the following symptoms:  Bloating: Depends what she eats   Nausea: NO  Vomiting: NO   Constipation: NO  Diarrhea: NO  SPECIFY INJECTABLE MEDICATION AND CURRENT DOSAGE: Wegovy 2.4 mg - last injection was on 11/27/2024 she has 2 pens left - no dose increase       Vitals:    Is BP less than 100/60? NO  Is BP greater than 140/90? NO  Is HR greater than 100? NO   **If yes to any of the above, have patient relax and repeat in 5-10 minutes**    Repeat values:    Is BP less than 100/60?  Is BP greater than 140/90?  Is HR greater than 100?  **If values remain outside of ranges above, please consult provider for next steps**

## 2025-01-16 ENCOUNTER — TELEPHONE (OUTPATIENT)
Age: 40
End: 2025-01-16

## 2025-01-16 NOTE — TELEPHONE ENCOUNTER
Patient has been added to the Talk Therapy wait list without a referral.    Insurance: [facility]   Insurance Type:    Commercial [x]   Medicaid []   County (if applicable)   Medicare []  Location Preference: anywhere  Provider Preference: non   Virtual: Yes [] No [x]  Were outside resources sent: Yes [] No [x]    Patient is a [employer] Employee.

## 2025-01-28 ENCOUNTER — TELEPHONE (OUTPATIENT)
Dept: BARIATRICS | Facility: CLINIC | Age: 40
End: 2025-01-28

## 2025-02-27 DIAGNOSIS — F41.9 ANXIETY: ICD-10-CM

## 2025-02-27 RX ORDER — BUPROPION HYDROCHLORIDE 100 MG/1
100 TABLET, EXTENDED RELEASE ORAL 2 TIMES DAILY
Qty: 180 TABLET | Refills: 1 | Status: SHIPPED | OUTPATIENT
Start: 2025-02-27

## 2025-05-13 ENCOUNTER — APPOINTMENT (OUTPATIENT)
Dept: LAB | Facility: HOSPITAL | Age: 40
End: 2025-05-13

## 2025-05-13 DIAGNOSIS — Z00.8 HEALTH EXAMINATION IN POPULATION SURVEY: ICD-10-CM

## 2025-05-13 LAB
CHOLEST SERPL-MCNC: 201 MG/DL (ref ?–200)
EST. AVERAGE GLUCOSE BLD GHB EST-MCNC: 100 MG/DL
HBA1C MFR BLD: 5.1 %
HDLC SERPL-MCNC: 43 MG/DL
LDLC SERPL CALC-MCNC: 136 MG/DL (ref 0–100)
NONHDLC SERPL-MCNC: 158 MG/DL
TRIGL SERPL-MCNC: 112 MG/DL (ref ?–150)

## 2025-05-13 PROCEDURE — 80061 LIPID PANEL: CPT

## 2025-05-13 PROCEDURE — 36415 COLL VENOUS BLD VENIPUNCTURE: CPT

## 2025-05-13 PROCEDURE — 83036 HEMOGLOBIN GLYCOSYLATED A1C: CPT

## 2025-08-21 ENCOUNTER — TELEPHONE (OUTPATIENT)
Age: 40
End: 2025-08-21